# Patient Record
Sex: FEMALE | Race: BLACK OR AFRICAN AMERICAN | NOT HISPANIC OR LATINO
[De-identification: names, ages, dates, MRNs, and addresses within clinical notes are randomized per-mention and may not be internally consistent; named-entity substitution may affect disease eponyms.]

---

## 2017-12-08 PROBLEM — Z00.00 ENCOUNTER FOR PREVENTIVE HEALTH EXAMINATION: Status: ACTIVE | Noted: 2017-12-08

## 2018-01-02 ENCOUNTER — APPOINTMENT (OUTPATIENT)
Dept: OPHTHALMOLOGY | Facility: CLINIC | Age: 78
End: 2018-01-02

## 2018-07-01 ENCOUNTER — OUTPATIENT (OUTPATIENT)
Dept: OUTPATIENT SERVICES | Facility: HOSPITAL | Age: 78
LOS: 1 days | End: 2018-07-01
Payer: MEDICARE

## 2018-07-01 PROCEDURE — G9001: CPT

## 2019-04-12 DIAGNOSIS — Z71.89 OTHER SPECIFIED COUNSELING: ICD-10-CM

## 2021-05-18 ENCOUNTER — INPATIENT (INPATIENT)
Facility: HOSPITAL | Age: 81
LOS: 2 days | Discharge: HOME | DRG: 682 | End: 2021-05-21
Attending: HOSPITALIST | Admitting: HOSPITALIST
Payer: MEDICARE

## 2021-05-18 VITALS
RESPIRATION RATE: 18 BRPM | TEMPERATURE: 98 F | SYSTOLIC BLOOD PRESSURE: 120 MMHG | DIASTOLIC BLOOD PRESSURE: 94 MMHG | HEART RATE: 56 BPM | OXYGEN SATURATION: 97 %

## 2021-05-18 PROCEDURE — 73562 X-RAY EXAM OF KNEE 3: CPT | Mod: 26,50

## 2021-05-18 PROCEDURE — 71045 X-RAY EXAM CHEST 1 VIEW: CPT | Mod: 26

## 2021-05-18 PROCEDURE — 99285 EMERGENCY DEPT VISIT HI MDM: CPT | Mod: CS

## 2021-05-19 DIAGNOSIS — Z51.5 ENCOUNTER FOR PALLIATIVE CARE: ICD-10-CM

## 2021-05-19 DIAGNOSIS — R53.81 OTHER MALAISE: ICD-10-CM

## 2021-05-19 DIAGNOSIS — E43 UNSPECIFIED SEVERE PROTEIN-CALORIE MALNUTRITION: ICD-10-CM

## 2021-05-19 DIAGNOSIS — Z29.9 ENCOUNTER FOR PROPHYLACTIC MEASURES, UNSPECIFIED: ICD-10-CM

## 2021-05-19 DIAGNOSIS — I21.4 NON-ST ELEVATION (NSTEMI) MYOCARDIAL INFARCTION: ICD-10-CM

## 2021-05-19 DIAGNOSIS — N39.0 URINARY TRACT INFECTION, SITE NOT SPECIFIED: ICD-10-CM

## 2021-05-19 DIAGNOSIS — R77.8 OTHER SPECIFIED ABNORMALITIES OF PLASMA PROTEINS: ICD-10-CM

## 2021-05-19 DIAGNOSIS — E87.5 HYPERKALEMIA: ICD-10-CM

## 2021-05-19 DIAGNOSIS — R26.2 DIFFICULTY IN WALKING, NOT ELSEWHERE CLASSIFIED: ICD-10-CM

## 2021-05-19 DIAGNOSIS — M06.9 RHEUMATOID ARTHRITIS, UNSPECIFIED: ICD-10-CM

## 2021-05-19 DIAGNOSIS — N18.9 CHRONIC KIDNEY DISEASE, UNSPECIFIED: ICD-10-CM

## 2021-05-19 LAB
24R-OH-CALCIDIOL SERPL-MCNC: 19.3 NG/ML — LOW (ref 30–80)
A1C WITH ESTIMATED AVERAGE GLUCOSE RESULT: 4.6 % — SIGNIFICANT CHANGE UP (ref 4–5.6)
ACANTHOCYTES BLD QL SMEAR: SLIGHT — SIGNIFICANT CHANGE UP
ALBUMIN SERPL ELPH-MCNC: 2.8 G/DL — LOW (ref 3.5–5)
ALP SERPL-CCNC: 86 U/L — SIGNIFICANT CHANGE UP (ref 40–120)
ALT FLD-CCNC: 18 U/L DA — SIGNIFICANT CHANGE UP (ref 10–60)
ANION GAP SERPL CALC-SCNC: 13 MMOL/L — SIGNIFICANT CHANGE UP (ref 5–17)
ANION GAP SERPL CALC-SCNC: 13 MMOL/L — SIGNIFICANT CHANGE UP (ref 5–17)
ANISOCYTOSIS BLD QL: SLIGHT — SIGNIFICANT CHANGE UP
APPEARANCE UR: CLEAR — SIGNIFICANT CHANGE UP
AST SERPL-CCNC: 34 U/L — SIGNIFICANT CHANGE UP (ref 10–40)
BASOPHILS # BLD AUTO: 0.03 K/UL — SIGNIFICANT CHANGE UP (ref 0–0.2)
BASOPHILS # BLD AUTO: 0.04 K/UL — SIGNIFICANT CHANGE UP (ref 0–0.2)
BASOPHILS NFR BLD AUTO: 0.5 % — SIGNIFICANT CHANGE UP (ref 0–2)
BASOPHILS NFR BLD AUTO: 0.7 % — SIGNIFICANT CHANGE UP (ref 0–2)
BILIRUB SERPL-MCNC: 0.6 MG/DL — SIGNIFICANT CHANGE UP (ref 0.2–1.2)
BILIRUB UR-MCNC: NEGATIVE — SIGNIFICANT CHANGE UP
BUN SERPL-MCNC: 114 MG/DL — HIGH (ref 7–18)
BUN SERPL-MCNC: 117 MG/DL — HIGH (ref 7–18)
CALCIUM SERPL-MCNC: 9.1 MG/DL — SIGNIFICANT CHANGE UP (ref 8.4–10.5)
CALCIUM SERPL-MCNC: 9.4 MG/DL — SIGNIFICANT CHANGE UP (ref 8.4–10.5)
CALCIUM SERPL-MCNC: 9.5 MG/DL — SIGNIFICANT CHANGE UP (ref 8.4–10.5)
CHLORIDE SERPL-SCNC: 115 MMOL/L — HIGH (ref 96–108)
CHLORIDE SERPL-SCNC: 116 MMOL/L — HIGH (ref 96–108)
CHOLEST SERPL-MCNC: 233 MG/DL — HIGH
CO2 SERPL-SCNC: 12 MMOL/L — LOW (ref 22–31)
CO2 SERPL-SCNC: 13 MMOL/L — LOW (ref 22–31)
COLOR SPEC: YELLOW — SIGNIFICANT CHANGE UP
CREAT ?TM UR-MCNC: 71 MG/DL — SIGNIFICANT CHANGE UP
CREAT SERPL-MCNC: 8.07 MG/DL — HIGH (ref 0.5–1.3)
CREAT SERPL-MCNC: 8.35 MG/DL — HIGH (ref 0.5–1.3)
CRP SERPL-MCNC: 20 MG/L — HIGH
DIFF PNL FLD: ABNORMAL
EOSINOPHIL # BLD AUTO: 0.08 K/UL — SIGNIFICANT CHANGE UP (ref 0–0.5)
EOSINOPHIL # BLD AUTO: 0.11 K/UL — SIGNIFICANT CHANGE UP (ref 0–0.5)
EOSINOPHIL NFR BLD AUTO: 1.4 % — SIGNIFICANT CHANGE UP (ref 0–6)
EOSINOPHIL NFR BLD AUTO: 1.9 % — SIGNIFICANT CHANGE UP (ref 0–6)
ERYTHROCYTE [SEDIMENTATION RATE] IN BLOOD: 15 MM/HR — SIGNIFICANT CHANGE UP (ref 0–20)
ESTIMATED AVERAGE GLUCOSE: 85 MG/DL — SIGNIFICANT CHANGE UP (ref 68–114)
FERRITIN SERPL-MCNC: 1993 NG/ML — HIGH (ref 15–150)
FOLATE SERPL-MCNC: 5.9 NG/ML — SIGNIFICANT CHANGE UP
GLUCOSE SERPL-MCNC: 82 MG/DL — SIGNIFICANT CHANGE UP (ref 70–99)
GLUCOSE SERPL-MCNC: 89 MG/DL — SIGNIFICANT CHANGE UP (ref 70–99)
GLUCOSE UR QL: NEGATIVE — SIGNIFICANT CHANGE UP
HCT VFR BLD CALC: 29.5 % — LOW (ref 34.5–45)
HCT VFR BLD CALC: 30.8 % — LOW (ref 34.5–45)
HDLC SERPL-MCNC: 74 MG/DL — SIGNIFICANT CHANGE UP
HGB BLD-MCNC: 10.1 G/DL — LOW (ref 11.5–15.5)
HGB BLD-MCNC: 9.5 G/DL — LOW (ref 11.5–15.5)
HYPOCHROMIA BLD QL: SLIGHT — SIGNIFICANT CHANGE UP
IMM GRANULOCYTES NFR BLD AUTO: 0.2 % — SIGNIFICANT CHANGE UP (ref 0–1.5)
IMM GRANULOCYTES NFR BLD AUTO: 0.3 % — SIGNIFICANT CHANGE UP (ref 0–1.5)
IRON SATN MFR SERPL: 117 UG/DL — SIGNIFICANT CHANGE UP (ref 40–150)
IRON SATN MFR SERPL: 92 % — HIGH (ref 15–50)
KETONES UR-MCNC: NEGATIVE — SIGNIFICANT CHANGE UP
LDH SERPL L TO P-CCNC: 254 U/L — HIGH (ref 120–225)
LEUKOCYTE ESTERASE UR-ACNC: ABNORMAL
LIPID PNL WITH DIRECT LDL SERPL: 145 MG/DL — HIGH
LYMPHOCYTES # BLD AUTO: 1.02 K/UL — SIGNIFICANT CHANGE UP (ref 1–3.3)
LYMPHOCYTES # BLD AUTO: 1.32 K/UL — SIGNIFICANT CHANGE UP (ref 1–3.3)
LYMPHOCYTES # BLD AUTO: 18 % — SIGNIFICANT CHANGE UP (ref 13–44)
LYMPHOCYTES # BLD AUTO: 22.3 % — SIGNIFICANT CHANGE UP (ref 13–44)
MAGNESIUM SERPL-MCNC: 2.1 MG/DL — SIGNIFICANT CHANGE UP (ref 1.6–2.6)
MANUAL SMEAR VERIFICATION: SIGNIFICANT CHANGE UP
MCHC RBC-ENTMCNC: 26.8 PG — LOW (ref 27–34)
MCHC RBC-ENTMCNC: 27.1 PG — SIGNIFICANT CHANGE UP (ref 27–34)
MCHC RBC-ENTMCNC: 32.2 GM/DL — SIGNIFICANT CHANGE UP (ref 32–36)
MCHC RBC-ENTMCNC: 32.8 GM/DL — SIGNIFICANT CHANGE UP (ref 32–36)
MCV RBC AUTO: 82.6 FL — SIGNIFICANT CHANGE UP (ref 80–100)
MCV RBC AUTO: 83.1 FL — SIGNIFICANT CHANGE UP (ref 80–100)
MICROCYTES BLD QL: SLIGHT — SIGNIFICANT CHANGE UP
MONOCYTES # BLD AUTO: 0.56 K/UL — SIGNIFICANT CHANGE UP (ref 0–0.9)
MONOCYTES # BLD AUTO: 0.6 K/UL — SIGNIFICANT CHANGE UP (ref 0–0.9)
MONOCYTES NFR BLD AUTO: 10.6 % — SIGNIFICANT CHANGE UP (ref 2–14)
MONOCYTES NFR BLD AUTO: 9.4 % — SIGNIFICANT CHANGE UP (ref 2–14)
NEUTROPHILS # BLD AUTO: 3.89 K/UL — SIGNIFICANT CHANGE UP (ref 1.8–7.4)
NEUTROPHILS # BLD AUTO: 3.93 K/UL — SIGNIFICANT CHANGE UP (ref 1.8–7.4)
NEUTROPHILS NFR BLD AUTO: 65.6 % — SIGNIFICANT CHANGE UP (ref 43–77)
NEUTROPHILS NFR BLD AUTO: 69.1 % — SIGNIFICANT CHANGE UP (ref 43–77)
NITRITE UR-MCNC: NEGATIVE — SIGNIFICANT CHANGE UP
NON HDL CHOLESTEROL: 159 MG/DL — HIGH
NRBC # BLD: 0 /100 WBCS — SIGNIFICANT CHANGE UP (ref 0–0)
NRBC # BLD: 0 /100 WBCS — SIGNIFICANT CHANGE UP (ref 0–0)
PH UR: 6 — SIGNIFICANT CHANGE UP (ref 5–8)
PHOSPHATE SERPL-MCNC: 5.8 MG/DL — HIGH (ref 2.5–4.5)
PLAT MORPH BLD: NORMAL — SIGNIFICANT CHANGE UP
PLATELET # BLD AUTO: 109 K/UL — LOW (ref 150–400)
PLATELET # BLD AUTO: 116 K/UL — LOW (ref 150–400)
PLATELET COUNT - ESTIMATE: ABNORMAL
POIKILOCYTOSIS BLD QL AUTO: SLIGHT — SIGNIFICANT CHANGE UP
POTASSIUM SERPL-MCNC: 5.3 MMOL/L — SIGNIFICANT CHANGE UP (ref 3.5–5.3)
POTASSIUM SERPL-MCNC: 5.4 MMOL/L — HIGH (ref 3.5–5.3)
POTASSIUM SERPL-SCNC: 5.3 MMOL/L — SIGNIFICANT CHANGE UP (ref 3.5–5.3)
POTASSIUM SERPL-SCNC: 5.4 MMOL/L — HIGH (ref 3.5–5.3)
PROCALCITONIN SERPL-MCNC: 0.66 NG/ML — HIGH (ref 0.02–0.1)
PROT ?TM UR-MCNC: 259 MG/DL — HIGH (ref 0–12)
PROT SERPL-MCNC: 7.1 G/DL — SIGNIFICANT CHANGE UP (ref 6–8.3)
PROT UR-MCNC: 500 MG/DL
PTH-INTACT FLD-MCNC: 157 PG/ML — HIGH (ref 15–65)
RBC # BLD: 3.55 M/UL — LOW (ref 3.8–5.2)
RBC # BLD: 3.55 M/UL — LOW (ref 3.8–5.2)
RBC # BLD: 3.73 M/UL — LOW (ref 3.8–5.2)
RBC # FLD: 18.4 % — HIGH (ref 10.3–14.5)
RBC # FLD: 18.6 % — HIGH (ref 10.3–14.5)
RBC BLD AUTO: ABNORMAL
RETICS #: 28.7 K/UL — SIGNIFICANT CHANGE UP (ref 25–125)
RETICS/RBC NFR: 0.8 % — SIGNIFICANT CHANGE UP (ref 0.5–2.5)
SARS-COV-2 RNA SPEC QL NAA+PROBE: SIGNIFICANT CHANGE UP
SCHISTOCYTES BLD QL AUTO: SLIGHT — SIGNIFICANT CHANGE UP
SODIUM SERPL-SCNC: 141 MMOL/L — SIGNIFICANT CHANGE UP (ref 135–145)
SODIUM SERPL-SCNC: 141 MMOL/L — SIGNIFICANT CHANGE UP (ref 135–145)
SODIUM UR-SCNC: 54 MMOL/L — SIGNIFICANT CHANGE UP
SP GR SPEC: 1.01 — SIGNIFICANT CHANGE UP (ref 1.01–1.02)
T4 FREE SERPL-MCNC: 0.6 NG/DL — LOW (ref 0.9–1.8)
THYROPEROXIDASE AB SERPL-ACNC: <10 IU/ML — SIGNIFICANT CHANGE UP
TIBC SERPL-MCNC: 127 UG/DL — LOW (ref 250–450)
TRIGL SERPL-MCNC: 71 MG/DL — SIGNIFICANT CHANGE UP
TROPONIN I SERPL-MCNC: 0.06 NG/ML — HIGH (ref 0–0.04)
TROPONIN I SERPL-MCNC: 0.06 NG/ML — HIGH (ref 0–0.04)
TSH SERPL-MCNC: 66.5 UU/ML — HIGH (ref 0.34–4.82)
UIBC SERPL-MCNC: 10 UG/DL — LOW (ref 110–370)
URATE SERPL-MCNC: 8.3 MG/DL — HIGH (ref 2.5–7)
URATE UR-MCNC: 24.5 MG/DL — SIGNIFICANT CHANGE UP
UROBILINOGEN FLD QL: NEGATIVE — SIGNIFICANT CHANGE UP
VIT B12 SERPL-MCNC: 979 PG/ML — SIGNIFICANT CHANGE UP (ref 232–1245)
VIT D25+D1,25 OH+D1,25 PNL SERPL-MCNC: 41.2 PG/ML — SIGNIFICANT CHANGE UP (ref 19.9–79.3)
WBC # BLD: 5.68 K/UL — SIGNIFICANT CHANGE UP (ref 3.8–10.5)
WBC # BLD: 5.93 K/UL — SIGNIFICANT CHANGE UP (ref 3.8–10.5)
WBC # FLD AUTO: 5.68 K/UL — SIGNIFICANT CHANGE UP (ref 3.8–10.5)
WBC # FLD AUTO: 5.93 K/UL — SIGNIFICANT CHANGE UP (ref 3.8–10.5)

## 2021-05-19 PROCEDURE — 99223 1ST HOSP IP/OBS HIGH 75: CPT

## 2021-05-19 PROCEDURE — 99222 1ST HOSP IP/OBS MODERATE 55: CPT

## 2021-05-19 PROCEDURE — 99497 ADVNCD CARE PLAN 30 MIN: CPT | Mod: 25

## 2021-05-19 RX ORDER — SODIUM CHLORIDE 9 MG/ML
1000 INJECTION INTRAMUSCULAR; INTRAVENOUS; SUBCUTANEOUS ONCE
Refills: 0 | Status: COMPLETED | OUTPATIENT
Start: 2021-05-19 | End: 2021-05-19

## 2021-05-19 RX ORDER — SODIUM BICARBONATE 1 MEQ/ML
1300 SYRINGE (ML) INTRAVENOUS
Refills: 0 | Status: DISCONTINUED | OUTPATIENT
Start: 2021-05-19 | End: 2021-05-21

## 2021-05-19 RX ORDER — HYDRALAZINE HCL 50 MG
5 TABLET ORAL ONCE
Refills: 0 | Status: COMPLETED | OUTPATIENT
Start: 2021-05-19 | End: 2021-05-19

## 2021-05-19 RX ORDER — ERGOCALCIFEROL 1.25 MG/1
0 CAPSULE ORAL
Qty: 0 | Refills: 0 | DISCHARGE

## 2021-05-19 RX ORDER — LEVOTHYROXINE SODIUM 125 MCG
25 TABLET ORAL DAILY
Refills: 0 | Status: DISCONTINUED | OUTPATIENT
Start: 2021-05-19 | End: 2021-05-21

## 2021-05-19 RX ORDER — ACETAMINOPHEN 500 MG
650 TABLET ORAL EVERY 6 HOURS
Refills: 0 | Status: DISCONTINUED | OUTPATIENT
Start: 2021-05-19 | End: 2021-05-21

## 2021-05-19 RX ORDER — CEFTRIAXONE 500 MG/1
1000 INJECTION, POWDER, FOR SOLUTION INTRAMUSCULAR; INTRAVENOUS ONCE
Refills: 0 | Status: COMPLETED | OUTPATIENT
Start: 2021-05-19 | End: 2021-05-19

## 2021-05-19 RX ORDER — SODIUM ZIRCONIUM CYCLOSILICATE 10 G/10G
10 POWDER, FOR SUSPENSION ORAL ONCE
Refills: 0 | Status: COMPLETED | OUTPATIENT
Start: 2021-05-19 | End: 2021-05-19

## 2021-05-19 RX ORDER — ISOSORBIDE MONONITRATE 60 MG/1
120 TABLET, EXTENDED RELEASE ORAL DAILY
Refills: 0 | Status: DISCONTINUED | OUTPATIENT
Start: 2021-05-20 | End: 2021-05-21

## 2021-05-19 RX ORDER — CALCITRIOL 0.5 UG/1
0 CAPSULE ORAL
Qty: 0 | Refills: 0 | DISCHARGE

## 2021-05-19 RX ORDER — HEPARIN SODIUM 5000 [USP'U]/ML
5000 INJECTION INTRAVENOUS; SUBCUTANEOUS EVERY 8 HOURS
Refills: 0 | Status: DISCONTINUED | OUTPATIENT
Start: 2021-05-19 | End: 2021-05-21

## 2021-05-19 RX ORDER — ACETAMINOPHEN 500 MG
650 TABLET ORAL ONCE
Refills: 0 | Status: COMPLETED | OUTPATIENT
Start: 2021-05-19 | End: 2021-05-19

## 2021-05-19 RX ORDER — AMLODIPINE BESYLATE 2.5 MG/1
10 TABLET ORAL DAILY
Refills: 0 | Status: DISCONTINUED | OUTPATIENT
Start: 2021-05-19 | End: 2021-05-21

## 2021-05-19 RX ORDER — LABETALOL HCL 100 MG
200 TABLET ORAL DAILY
Refills: 0 | Status: DISCONTINUED | OUTPATIENT
Start: 2021-05-19 | End: 2021-05-20

## 2021-05-19 RX ORDER — CEFTRIAXONE 500 MG/1
1000 INJECTION, POWDER, FOR SOLUTION INTRAMUSCULAR; INTRAVENOUS EVERY 24 HOURS
Refills: 0 | Status: DISCONTINUED | OUTPATIENT
Start: 2021-05-20 | End: 2021-05-21

## 2021-05-19 RX ADMIN — CEFTRIAXONE 100 MILLIGRAM(S): 500 INJECTION, POWDER, FOR SOLUTION INTRAMUSCULAR; INTRAVENOUS at 02:50

## 2021-05-19 RX ADMIN — HEPARIN SODIUM 5000 UNIT(S): 5000 INJECTION INTRAVENOUS; SUBCUTANEOUS at 06:16

## 2021-05-19 RX ADMIN — Medication 650 MILLIGRAM(S): at 01:30

## 2021-05-19 RX ADMIN — HEPARIN SODIUM 5000 UNIT(S): 5000 INJECTION INTRAVENOUS; SUBCUTANEOUS at 21:48

## 2021-05-19 RX ADMIN — CEFTRIAXONE 1000 MILLIGRAM(S): 500 INJECTION, POWDER, FOR SOLUTION INTRAMUSCULAR; INTRAVENOUS at 02:50

## 2021-05-19 RX ADMIN — Medication 650 MILLIGRAM(S): at 06:16

## 2021-05-19 RX ADMIN — SODIUM CHLORIDE 1000 MILLILITER(S): 9 INJECTION INTRAMUSCULAR; INTRAVENOUS; SUBCUTANEOUS at 02:21

## 2021-05-19 RX ADMIN — Medication 650 MILLIGRAM(S): at 01:00

## 2021-05-19 RX ADMIN — Medication 5 MILLIGRAM(S): at 22:10

## 2021-05-19 RX ADMIN — Medication 200 MILLIGRAM(S): at 20:16

## 2021-05-19 RX ADMIN — Medication 1300 MILLIGRAM(S): at 18:06

## 2021-05-19 RX ADMIN — SODIUM ZIRCONIUM CYCLOSILICATE 10 GRAM(S): 10 POWDER, FOR SUSPENSION ORAL at 03:23

## 2021-05-19 RX ADMIN — AMLODIPINE BESYLATE 10 MILLIGRAM(S): 2.5 TABLET ORAL at 17:46

## 2021-05-19 NOTE — ED PROVIDER NOTE - ENMT, MLM
Normocephalic atraumatic head. Airway patent, Nasal mucosa clear. Mouth with normal mucosa. Throat has no vesicles, no oropharyngeal exudates and uvula is midline.

## 2021-05-19 NOTE — CONSULT NOTE ADULT - SUBJECTIVE AND OBJECTIVE BOX
CHIEF COMPLAINT: Fall    HPI: 80 yo F with HTN and ESRD (refusing HD) who presented after a fall. Patient     PAST MEDICAL & SURGICAL HISTORY:  As above, also Anemia, Rheumatoid arthritis    No significant past surgical history    Allergies    penicillin (Rash)    MEDICATIONS  (STANDING):  heparin   Injectable 5000 Unit(s) SubCutaneous every 8 hours    MEDICATIONS  (PRN):  acetaminophen   Tablet .. 650 milliGRAM(s) Oral every 6 hours PRN Mild Pain (1 - 3)      FAMILY HISTORY:    No family history of premature coronary artery disease or sudden cardiac death    SOCIAL HISTORY:  Smoking-  Alcohol-  Illicit Drug use-    REVIEW OF SYSTEMS:  Constitutional: [ ] fever, [ ]weight loss,  [ ]fatigue  Eyes: [ ] visual changes  Respiratory: [ ]shortness of breath;  [ ] cough, [ ]wheezing, [ ]chills, [ ]hemoptysis  Cardiovascular: [ ] chest pain, [ ]palpitations, [ ]dizziness,  [ ]leg swelling [ ]syncope  Gastrointestinal: [ ] abdominal pain, [ ]nausea, [ ]vomiting,  [ ]diarrhea   Genitourinary: [ ] dysuria, [ ] hematuria  Neurologic: [ ] headaches [ ] tremors  [ ] weakness [ ] lightheadedness  Skin: [ ] itching, [ ]burning, [ ] rashes  Endocrine: [ ] heat or cold intolerance  Musculoskeletal: [ ] joint pain or swelling; [ ] muscle, back, or extremity pain  Psychiatric: [ ] depression, [ ]anxiety, [ ]mood swings, or [ ]difficulty sleeping  Hematologic: [ ] easy bruising, [ ] bleeding gums       [ x] All others negative	  [ ] Unable to obtain    Vital Signs Last 24 Hrs  T(C): 36.2 (19 May 2021 07:54), Max: 36.8 (19 May 2021 03:06)  T(F): 97.2 (19 May 2021 07:54), Max: 98.3 (19 May 2021 03:06)  HR: 58 (19 May 2021 07:54) (56 - 67)  BP: 175/69 (19 May 2021 07:54) (120/94 - 175/69)  BP(mean): --  RR: 18 (19 May 2021 07:54) (18 - 18)  SpO2: 100% (19 May 2021 07:54) (97% - 100%)  I&O's Summary      PHYSICAL EXAM:  General: No acute distress  HEENT: EOMI, PERRL  Neck: Supple, No JVD  Lungs: Clear to auscultation bilaterally; No rales or wheezing  Heart: Regular rate and rhythm; No murmurs, rubs, or gallops  Abdomen: Nontender, bowel sounds present  Extremities: No clubbing, cyanosis, or edema  Nervous system:  Alert & Oriented X3, no focal deficits  Psychiatric: Normal affect  Skin: No rashes or lesions      LABS:  05-19    141  |  116<H>  |  114<H>  ----------------------------<  82  5.3   |  12<L>  |  8.07<H>    Ca    9.1      19 May 2021 05:51  Phos  5.8     05-19  Mg     2.1     05-19    TPro  7.1  /  Alb  2.8<L>  /  TBili  0.6  /  DBili  x   /  AST  34  /  ALT  18  /  AlkPhos  86  05-19    Creatinine Trend: 8.07<--, 8.35<--                        9.5    5.93  )-----------( 109      ( 19 May 2021 05:51 )             29.5     Lipid Panel: Cholesterol, Serum 233  Direct LDL --145  HDL Cholesterol, Serum 74  Triglycerides, Serum 71    Cardiac Enzymes: CARDIAC MARKERS ( 19 May 2021 05:51 )  0.061 ng/mL / x     / x     / x     / x      CARDIAC MARKERS ( 19 May 2021 00:33 )  0.062 ng/mL / x     / x     / x     / x        RADIOLOGY: < from: Xray Chest 1 View-PORTABLE IMMEDIATE (Xray Chest 1 View-PORTABLE IMMEDIATE .) (05.18.21 @ 23:49) >  IMPRESSION: Respiratory motion. No gross infiltrate.    < end of copied text >    ECG [my interpretation]: 5/19/21 @ 01:55: Sinus rhythm, LAFB    TELEMETRY:      CHIEF COMPLAINT: Fall    HPI: 82 yo F with HTN and ESRD (refusing HD) who presented after a fall. Patient reports that her knees gave out under her and she fell down. Denies any preceding or subsequent chest pain, dyspnea, palpitations, lightheadedness, or syncope. Patient denies LE edema, orthopnea, or PND. Currently has no complaints aside from her legs.     PAST MEDICAL & SURGICAL HISTORY:  As above, also Anemia, Rheumatoid arthritis    No significant past surgical history    Allergies    penicillin (Rash)    MEDICATIONS  (STANDING):  heparin   Injectable 5000 Unit(s) SubCutaneous every 8 hours    MEDICATIONS  (PRN):  acetaminophen   Tablet .. 650 milliGRAM(s) Oral every 6 hours PRN Mild Pain (1 - 3)    FAMILY HISTORY:    No family history of premature coronary artery disease or sudden cardiac death    SOCIAL HISTORY:  Smoking-Denies  Alcohol-Denies  Illicit Drug use-Denies    REVIEW OF SYSTEMS:  Constitutional: [ ] fever, [ ]weight loss,  [ ]fatigue  Eyes: [ ] visual changes  Respiratory: [ ]shortness of breath;  [ ] cough, [ ]wheezing, [ ]chills, [ ]hemoptysis  Cardiovascular: [ ] chest pain, [ ]palpitations, [ ]dizziness,  [ ]leg swelling [ ]syncope  Gastrointestinal: [ ] abdominal pain, [ ]nausea, [ ]vomiting,  [ ]diarrhea   Genitourinary: [ ] dysuria, [ ] hematuria  Neurologic: [ ] headaches [ ] tremors  [ ] weakness [ ] lightheadedness  Skin: [ ] itching, [ ]burning, [ ] rashes  Endocrine: [ ] heat or cold intolerance  Musculoskeletal: [ ] joint pain or swelling; [ ] muscle, back, or extremity pain  Psychiatric: [ ] depression, [ ]anxiety, [ ]mood swings, or [ ]difficulty sleeping  Hematologic: [ ] easy bruising, [ ] bleeding gums       [ x] All others negative	  [ ] Unable to obtain    Vital Signs Last 24 Hrs  T(C): 36.2 (19 May 2021 07:54), Max: 36.8 (19 May 2021 03:06)  T(F): 97.2 (19 May 2021 07:54), Max: 98.3 (19 May 2021 03:06)  HR: 58 (19 May 2021 07:54) (56 - 67)  BP: 175/69 (19 May 2021 07:54) (120/94 - 175/69)  BP(mean): --  RR: 18 (19 May 2021 07:54) (18 - 18)  SpO2: 100% (19 May 2021 07:54) (97% - 100%)  I&O's Summary      PHYSICAL EXAM:  General: No acute distress  HEENT: EOMI, PERRL  Neck: Supple, No JVD  Lungs: Clear to auscultation bilaterally; No rales or wheezing  Heart: Regular rate and rhythm; No murmurs, rubs, or gallops  Abdomen: Nontender, bowel sounds present  Extremities: No clubbing, cyanosis, or edema  Nervous system:  Alert & Oriented X3, no focal deficits  Psychiatric: Normal affect  Skin: No rashes or lesions      LABS:  05-19    141  |  116<H>  |  114<H>  ----------------------------<  82  5.3   |  12<L>  |  8.07<H>    Ca    9.1      19 May 2021 05:51  Phos  5.8     05-19  Mg     2.1     05-19    TPro  7.1  /  Alb  2.8<L>  /  TBili  0.6  /  DBili  x   /  AST  34  /  ALT  18  /  AlkPhos  86  05-19    Creatinine Trend: 8.07<--, 8.35<--                        9.5    5.93  )-----------( 109      ( 19 May 2021 05:51 )             29.5     Lipid Panel: Cholesterol, Serum 233  Direct LDL --145  HDL Cholesterol, Serum 74  Triglycerides, Serum 71    Cardiac Enzymes: CARDIAC MARKERS ( 19 May 2021 05:51 )  0.061 ng/mL / x     / x     / x     / x      CARDIAC MARKERS ( 19 May 2021 00:33 )  0.062 ng/mL / x     / x     / x     / x        RADIOLOGY: < from: Xray Chest 1 View-PORTABLE IMMEDIATE (Xray Chest 1 View-PORTABLE IMMEDIATE .) (05.18.21 @ 23:49) >  IMPRESSION: Respiratory motion. No gross infiltrate.    < end of copied text >    ECG [my interpretation]: 5/19/21 @ 01:55: Sinus rhythm, LAFB    TELEMETRY: Sinus rhythm, no events

## 2021-05-19 NOTE — CONSULT NOTE ADULT - SUBJECTIVE AND OBJECTIVE BOX
HPI:  82 y/o F pt from home lives with son, has HHA with a PMHx of HTN, Rheumatoid Arthritis and Anemia, kidney disease presents to ED c/o b/l knee pain and fall  evening. Pt reports due to her RA she has severe knee pain and sometimes feels her knees are cracking and legs give away. Pt uses walker to ambulate at baseline. Pt reportedly had trouble walking due to severe knee pain today so her son sent her for an evaluation. Pt does have a home attendant from 1-5PM on weekdays and her son takes care of her. Pt son was working today when the pt fell while at home alone and required her neighbor to help her. Pt denies any other acute complaints. Allergies: Penicillin  Pt was referred from rheumatologist to [facility] for kidney problem. Pt discussed dialysis option, but she doesn't want HD as "she doesn't believe in that". Nephrologist prescribed BP meds.  Pt states she stop taking prednisone 1 year ago as she feels steroid makes her condition worse in long term and stopped taking methotrexate 5 years ago. She takes only tylenol occasionally for her pain.    Interval hx: Pt seen and examined at the bedside, AOX3.  C/o pain to b/l knees, unable to quantify.        PAST MEDICAL & SURGICAL HISTORY:  Hypertension    Anemia    Arthritis    Rheumatoid arthritis    No significant past surgical history        SOCIAL HISTORY:    Admitted from:  home with her son; Has HHA 3 hours  Mon, Wed, Thurs, and Fri   Pt has 2 son, they make decisions as a family  Church:   [employer]    Surrogate/HCP/Guardian: London Mcgowan           Phone#: [phone]    FAMILY HISTORY:  No contributory history  Baseline ADLs (prior to admission): required assistance    Allergies    penicillin (Rash)    Intolerances      Present Symptoms:   Dyspnea: denies  Nausea/Vomiting: denies  Loss of appetite: yes  Pain:   yes                             location:  b/l knees        Review of Systems: [All others negative     MEDICATIONS  (STANDING):  heparin   Injectable 5000 Unit(s) SubCutaneous every 8 hours    MEDICATIONS  (PRN):  acetaminophen   Tablet .. 650 milliGRAM(s) Oral every 6 hours PRN Mild Pain (1 - 3)      PHYSICAL EXAM:    Vital Signs Last 24 Hrs  T(C): 36.4 (19 May 2021 11:16), Max: 36.8 (19 May 2021 03:06)  T(F): 97.6 (19 May 2021 11:16), Max: 98.3 (19 May 2021 03:06)  HR: 65 (19 May 2021 11:16) (56 - 67)  BP: 183/75 (19 May 2021 11:16) (120/94 - 183/75)  BP(mean): --  RR: 18 (19 May 2021 11:16) (18 - 18)  SpO2: 100% (19 May 2021 11:16) (97% - 100%)    General: Fragile elderly woman, AOX3  Karnofsky Performance Score/Palliative Performance Status Version2: 40    %    HEENT: bitemporal wasting, moist mucous membrane  Lungs: unlabored on RA  CV: RRR  GI: soft, non tender on palpation  : urinating  Musculoskeletal: b/l  UE and LE deformities   Skin: no rash or lesions noted  Neuro: no deficits cognitive impairment   Oral intake ability: poor po intake      LABS:                        9.5    5.93  )-----------( 109      ( 19 May 2021 05:51 )             29.5         141  |  116<H>  |  114<H>  ----------------------------<  82  5.3   |  12<L>  |  8.07<H>    Ca    9.1      19 May 2021 05:51  Phos  5.8       Mg     2.1         TPro  7.1  /  Alb  2.8<L>  /  TBili  0.6  /  DBili  x   /  AST  34  /  ALT  18  /  AlkPhos  86      Urinalysis Basic - ( 19 May 2021 02:26 )    Color: Yellow / Appearance: Clear / S.015 / pH: x  Gluc: x / Ketone: Negative  / Bili: Negative / Urobili: Negative   Blood: x / Protein: 500 mg/dL / Nitrite: Negative   Leuk Esterase: Moderate / RBC: 5-10 /HPF / WBC 11-25 /HPF   Sq Epi: x / Non Sq Epi: Few /HPF / Bacteria: Moderate /HPF    < from: Xray Chest 1 View-PORTABLE IMMEDIATE (Xray Chest 1 View-PORTABLE IMMEDIATE .) (21 @ 23:49) >  EXAM:  XR CHEST PORTABLE IMMED 1V                            PROCEDURE DATE:  2021          INTERPRETATION:  AP chest on May 18, 2021 at 11:36 PM. Patient had a fall and has pain in both knees.    COMPARISON: None available.    There is respiratory motion on the study.    Heart magnified by technique.    No gross infiltrate.    There are Hill-Sachs deformities in both humeral heads.    IMPRESSION: Respiratory motion. No gross infiltrate.        < end of copied text >      RADIOLOGY & ADDITIONAL STUDIES: Reviewed    ADVANCE DIRECTIVES: DNR/DNI

## 2021-05-19 NOTE — ED PROVIDER NOTE - CLINICAL SUMMARY MEDICAL DECISION MAKING FREE TEXT BOX
82 y/o F pt Rheumatoid Arthritis and HTN presents w/worsening b/l knee pain and recurrent falls. Will obtain EKG, labs, knee X-rays 80 y/o F pt Rheumatoid Arthritis and HTN presents w/worsening b/l knee pain and recurrent falls. Will obtain EKG, labs, knee X-rays    ekg nonischemic, trop 0.62-ASA held in setting of CKD and no active chest pain, Cr 8.3, K 5.4-given 1L NS and child catheter placed, only minimal urine output, UA sent  CXR shows bilateral patchy infiltrates, knee Xrs shows degenerative changes  Discussed above with patient and son over phone who agree with plan for admission. 80 y/o F pt Rheumatoid Arthritis and HTN presents w/worsening b/l knee pain and recurrent falls. Will obtain EKG, labs, knee X-rays    ekg nonischemic, trop 0.62-ASA held in setting of CKD and no active chest pain, Cr 8.3, K 5.4-given 1L NS and child catheter placed, only minimal urine output, UA cw uti  CXR shows bilateral patchy infiltrates, knee Xrs shows degenerative changes  Discussed above with patient and son over phone who agree with plan for admission.

## 2021-05-19 NOTE — CONSULT NOTE ADULT - ATTENDING COMMENTS
81 y F with severe RA, progression of CKD, min ambulatory, s/p fall. Lives w son, has HHA 4 hr/d. Pt refusing HD, pt and son to discuss further goals of care. Palliative will follow. 81 y F with severe RA, progression of CKD, min ambulatory, s/p fall. Lives w son, has HHA 4 hr/d. Pt refusing HD, pt and son to discuss further goals of care. Palliative will follow. DNR/DNI on file.

## 2021-05-19 NOTE — H&P ADULT - PROBLEM SELECTOR PLAN 5
Pt stop taking prednisone 1 year ago as she feels steroid makes her condition worse in long term and stopped taking methotrexate 5 years ago  Not on any RA meds now  Pt is refusing prednisone  Pain control with tylenol, tramadol, percocet  Pain management is consulted

## 2021-05-19 NOTE — H&P ADULT - PROBLEM SELECTOR PLAN 1
- P/w Cr 8.35  - baseline unknown, but she was evaluated in [facility] for kidney dysfunction 1 mon ago  - Pt is refusing HD   - s/p danyell, pt is making urine  - f/u PTH  - f/u BMP   f/u urine study  f/u  FeNa   Nephro Dr. Tim group is consulted - P/w Cr 8.35  - baseline unknown, but she was evaluated in [facility] for kidney dysfunction 1 mon ago  - Pt is refusing HD   - s/p danyell, pt is making urine  - f/u PTH  - f/u BMP   f/u urine study  f/u  FeNa   Nephro Dr. Tim group is consulted  Palliative is consulted as pt is refusing HD

## 2021-05-19 NOTE — CONSULT NOTE ADULT - ASSESSMENT
Patient is a 80yo Female with CKD-5, HTN 2/2 CKD, RA, Anemia p/w b/l knee pain s/p fall. Found to have advanced renal failure but refusing HD. Nephrology consulted for Elevated serum creatinine.      1. CKD-5- as per pt; last SCr ~5 and pt follows with Nephrologist Dr. Chevalier James. Pt with worsening renal function; now ESRD. Pt was told in the past she needs HD in the future but has declined.   Kera Sawant NP and I,  spoke with patient, her son London and her ex  (via phone). Discussed patients medical condition including worsening renal function and the need for HD. Discussed risk/ benefits/ alternative of RRT and different modalities of RRT. Pt initially refused RRT and then now is undecided. Pt wants more time to take to her family and will decide by tomorrow. Discussed the option of comfort care/ hospice if pt decides to decline HD. Strict I/Os. c/w Renal diet. Avoid nephrotoxins/ NSAIDs/ RCA. Monitor BMP.    2. Anemia of renal dz- low hgb. Will avoid venofer in the setting of elevated tsat/ ferritin. Will consider PIERRE if consistent with GOC. Check weight. Monitor hgb    3. HTN 2/2 CKD- uncontrolled BP- recc to increase Labetalol to 200mg PO bid, titrate as needed. c/w Amlodipine. c/w low salt diet. Monitor BP    4. Secondary HPT- PTHi 157 c/w calcitriol. Elevated serum phos- c/w low phos diet. Will consider Renvela if remains elevated. Monitor serum phos and serum calcium.     5. Metabolic acidosis- in the setting of renal failure. Recc Sodium bicarb 1300mg PO bid. Monitor serum  CO2.     6. Hyperkalemia- resolved. c/w low K diet. Can give Lokelma prn. Monitor serum K.       [facility] NEPHROLOGY  Dread Tim M.D.  Carrillo Nguyen D.O.  Madalyn Olivares M.D.  Yodit Carlos, MSN, ANP-C  [phone]    [id number] NEPHROLOGY  Dread Tim M.D.  Carrillo Nguyen D.O.  Madalyn Olivares M.D.  Yodit Carlos, MSN, ANP-C  [phone]    [address] [place] [id number]

## 2021-05-19 NOTE — CONSULT NOTE ADULT - SUBJECTIVE AND OBJECTIVE BOX
[facility] NEPHROLOGY- CONSULTATION NOTE    Patient is a 82yo Female with CKD-5, HTN 2/2 CKD, RA, Anemia p/w b/l knee pain s/p fall. Found to have advanced renal failure but refusing HD. Nephrology consulted for Elevated serum creatinine.    Pt aware of CKD for 2 years that progressively worsening. Pt follows with Nephrologist Dr. Chevalier James and was told in the past that she would need dialysis in the future. Pt states her last SCr was 5 and she was refusing HD in the past due to RA condition. Pt denies any SOB, chest pain, n/v/d, abd pain or LE edema or urinary complaints.   Pt c/o diffuse joint pain. Pt with joint deformities of b/l hands and is making it difficult to feed herself.     PAST MEDICAL & SURGICAL HISTORY:  Hypertension    Anemia    Arthritis    Rheumatoid arthritis    No significant past surgical history      penicillin (Rash)    Home Medications Reviewed  Hospital Medications:   MEDICATIONS  (STANDING):  amLODIPine   Tablet 10 milliGRAM(s) Oral daily  heparin   Injectable 5000 Unit(s) SubCutaneous every 8 hours  labetalol 200 milliGRAM(s) Oral daily    SOCIAL HISTORY:  Denies any toxic habits  FAMILY HISTORY:      REVIEW OF SYSTEMS:  Gen: no changes in weight  HEENT: no rhinorrhea  Neck: no sore throat  Cards: no chest pain  Resp: no dyspnea  GI: no nausea or vomiting or diarrhea  : no dysuria or hematuria  Vascular: no LE edema +b/l hand/ kneed pain  Derm: no rashes  Neuro: no numbness/tingling  All other review of systems is negative unless indicated above.    VITALS:  T(F): 97.7 (21 @ 15:24), Max: 98.3 (21 @ 03:06)  HR: 59 (21 @ 15:24)  BP: 208/70 (21 @ 15:24)  RR: 18 (21 @ 15:24)  SpO2: 100% (21 @ 15:24)  Wt(kg): --     @ 07:01  -   @ 16:22  --------------------------------------------------------  IN: 0 mL / OUT: 300 mL / NET: -300 mL      PHYSICAL EXAM:  Gen: NAD, calm  HEENT: MMM  Neck: no JVD  Cards: RRR, +S1/S2, no M/G/R  Resp: CTA B/L  GI: soft, NT/ND, NABS  : +child  Extremities: no LE edema B/L  b/l hand deformities from RA  Derm: no rashes  Neuro: AOX 3 but confused at times    LABS:      141  |  116<H>  |  114<H>  ----------------------------<  82  5.3   |  12<L>  |  8.07<H>    Ca    9.1      19 May 2021 05:51  Phos  5.8       Mg     2.1         TPro  7.1  /  Alb  2.8<L>  /  TBili  0.6  /  DBili      /  AST  34  /  ALT  18  /  AlkPhos  86      Creatinine Trend: 8.07 <--, 8.35 <--                        9.5    5.93  )-----------( 109      ( 19 May 2021 05:51 )             29.5     Urine Studies:  Urinalysis Basic - ( 19 May 2021 02:26 )    Color: Yellow / Appearance: Clear / S.015 / pH:   Gluc:  / Ketone: Negative  / Bili: Negative / Urobili: Negative   Blood:  / Protein: 500 mg/dL / Nitrite: Negative   Leuk Esterase: Moderate / RBC: 5-10 /HPF / WBC 11-25 /HPF   Sq Epi:  / Non Sq Epi: Few /HPF / Bacteria: Moderate /HPF      Sodium, Random Urine: 54 mmol/L ( @ 05:50)  Creatinine, Random Urine: 71 mg/dL ( @ 05:50)    RADIOLOGY & ADDITIONAL STUDIES:    < from: Xray Knee 3 Views, Bilateral (21 @ 23:50) >    EXAM:  XR KNEE AP LAT OBL 3 VIEWS BI                            PROCEDURE DATE:  2021        < end of copied text >  < from: Xray Knee 3 Views, Bilateral (21 @ 23:50) >  IMPRESSION: Arterial calcification and advanced bilateral knee degeneration. Small right knee effusion. No fracture.    < end of copied text >  < from: Xray Chest 1 View-PORTABLE IMMEDIATE (Xray Chest 1 View-PORTABLE IMMEDIATE .) (21 @ 23:49) >    EXAM:  XR CHEST PORTABLE IMMED 1V                            PROCEDURE DATE:  2021          INTERPRETATION:  AP chest on May 18, 2021 at 11:36 PM. Patient had a fall and has pain in both knees.    COMPARISON: None available.    There is respiratory motion on the study.    Heart magnified by technique.    No gross infiltrate.    There are Hill-Sachs deformities in both humeral heads.    IMPRESSION: Respiratory motion. No gross infiltrate.      < end of copied text >

## 2021-05-19 NOTE — PATIENT PROFILE ADULT - TRANSPORTATION
no Eucrisa Pregnancy And Lactation Text: This medication has not been assigned a Pregnancy Risk Category but animal studies failed to show danger with the topical medication. It is unknown if the medication is excreted in breast milk.

## 2021-05-19 NOTE — ED PROVIDER NOTE - EXTREMITY EXAM
Deformity over MCP joint to b/l hands. Pain w/range of motion of L knee. R knee stiffness unable to flex R knee.

## 2021-05-19 NOTE — CONSULT NOTE ADULT - CONVERSATION DETAILS
Met with the pt at the bedside, discussed her clinical condition and decisions regarding the treatment of advanced kidney disease.  Discussed risks vs benefits of individuals of advanced age receiving hemodialysis are at an increased risk of complications, given her frailties of unintended weight loss, worsening physical function and falls HD may not prove to optimize her life.  Ms. Mcgowan stated she is aware of the significant time commitment associated with hemodialysis and finds it to be overly burdensome.  She stated she does not want HD.  Pt is aware without HD she will die.  Although Ms. Mcgowan states she does not want HD, she appears to be having a difficult time committing to further goals of care, including hospice.    Spoke with the pt's son London, discussed above.  He stated he will need to speak with his mother before making any decisions.   Palliative care will follow.    Support provided. Met with the pt at the bedside, discussed her clinical condition and decisions regarding the treatment of advanced kidney disease.  Discussed risks vs benefits of individuals of advanced age receiving hemodialysis are at an increased risk of complications, given her frailties of unintended weight loss, worsening physical function and falls HD may not prove to optimize her life.  Ms. Mcgowan stated she is aware of the significant time commitment associated with hemodialysis and finds it to be overly burdensome.  She stated she does not want HD.  Pt is aware without HD she will die.  Although Ms. Mcgowan states she does not want HD, she appears to be having a difficult time committing to further goals of care, including hospice.    Spoke with the pt's son London, discussed above.  He stated he will need to speak with his mother before making any decisions.      Later met with the pt's son at the bedside, Dr. OBRIEN nephrologist also present.  Reviewed above.   Family needs time to consider clinical options.    Palliative care will follow.    Support provided. Met with the pt at the bedside, discussed her clinical condition and decisions regarding the treatment of advanced kidney disease.  Discussed risks vs benefits of individuals of advanced age receiving hemodialysis are at an increased risk of complications, given her frailties of unintended weight loss, worsening physical function and falls HD may not prove to optimize her life.  Ms. Mcgowan stated she is aware of the significant time commitment associated with hemodialysis and finds it to be overly burdensome.  She stated she does not want HD.  Pt is aware without HD she will die.  Although Ms. Mcgowan states she does not want HD, she appears to be having a difficult time committing to further goals of care, including hospice.    Spoke with the pt's son London, discussed above.  He stated he will need to speak with his mother before making any decisions.      Later met with the pt's son at the bedside, Dr. Olivares. nephrologist also present.  Reviewed above.   Family needs time to consider clinical options.    Palliative care will follow.    Support provided.

## 2021-05-19 NOTE — H&P ADULT - PROBLEM SELECTOR PLAN 3
- positive UA   - afebrile   - Pt has dysuria , increased urinary frequency  - Will start with rocephine 1g daily  - f/u urine cultures (specimen received)

## 2021-05-19 NOTE — CONSULT NOTE ADULT - PROBLEM SELECTOR RECOMMENDATION 3
2/2 comorbidities.  RA and CKD.  Bitemporal wasting with muscle mass/fat loss.  Pt reports poor po intake, with unintended weight loss. Albumin 2.8.  Diet as tolerated. High risk for skin failure.   Nutrition consult

## 2021-05-19 NOTE — CONSULT NOTE ADULT - PROBLEM SELECTOR RECOMMENDATION 9
CARLITO on CKD.  Elevated Scr, GFR 5.  Unknown baseline.  Nephrology consult    Pt stated she does not want HD.  Unable to commit to further goals of care including hospice.    Discussed hospice philosophy and locations of care.  Pt son needs time to speak with her about HD.  Palliative care will follow.         Avoid NSAIDs CARLITO on CKD.  Elevated Scr, GFR 5. Per PMD BUN 47 and Scr 1.99 in 2019.   Nephrology consult    Pt stated she does not want HD.  Unable to commit to further goals of care including hospice.    Discussed hospice philosophy and locations of care.  Pt son needs time to speak with her about HD.  Palliative care will follow.         Avoid NSAIDs

## 2021-05-19 NOTE — ED PROVIDER NOTE - OBJECTIVE STATEMENT
82 y/o F pt with a PMHx of HTN, Rheumatoid Arthritis and Anemia presents to ED c/o b/l knee pain and fall this evening. Pt reports due to her RA she has severe knee pain and sometimes feels her knees are cracking and legs give away. Pt uses walker to ambulate at baseline. Pt reportedly had trouble walking due to severe knee pain today so her son sent her for an evaluation. Pt does have a home attendant from 1-5PM on weekdays and her son takes care of her. Pt son was working today when the pt fell while at home alone and required her neighbor to help her. Pt denies any other acute complaints. Allergies: Penicillin 80 y/o F pt with a PMHx of HTN, Rheumatoid Arthritis and Anemia presents to ED c/o b/l knee pain and fall this evening. Pt reports due to her RA she has severe knee pain and sometimes feels her knees are cracking and legs give away. Pt uses walker to ambulate at baseline. Pt reportedly had trouble walking due to severe knee pain today so her son sent her for an evaluation. Pt does have a home attendant from 1-5PM on weekdays and her son takes care of her. Pt son was working today when the pt fell while at home alone and required her neighbor to help her. Pt denies any other acute complaints. Allergies: Penicillin    Spoke to Son maria d over phone [phone] who reports patient was hospitalized 1 month ago at [facility] where she was told she had kidney problems but did not require dialysis at that time. He reports current meds include: amlodipine 10mg daily, labetalol 200mg daily, isosorbide 120mg daily, calcitrol and Vitamin D

## 2021-05-19 NOTE — H&P ADULT - ASSESSMENT
82 y/o F pt from home lives with son, has HHA with a PMHx of HTN, Rheumatoid Arthritis and Anemia, kidney disease presents to ED c/o b/l knee pain and fall 5/18 evening. Pt reports due to her RA she has severe knee pain and sometimes feels her knees are cracking and legs give away. Pt uses walker to ambulate at baseline. Pt reportedly had trouble walking due to severe knee pain today so her son sent her for an evaluation. Pt does have a home attendant from 1-5PM on weekdays and her son takes care of her. Pt son was working today when the pt fell while at home alone and required her neighbor to help her. Pt denies any other acute complaints. Allergies: Penicillin  Pt was referred from rheumatologist to [facility] for kidney problem. Pt discussed dialysis option, but she doesn't want HD as "she doesn't believe in that". Nephrologist prescribed BP meds.  Pt states she stop taking prednisone 1 year ago as she feels steroid makes her condition worse in long term and stopped taking methotrexate 5 years ago. She takes only tylenol occasionally for her pain.    ED attending spoke to Lambert Callahan over phone [phone] reports current meds include: amlodipine 10mg daily, labetalol 200mg daily, isosorbide 120mg daily, calcitrol and Vitamin D.   82 y/o F pt from home lives with son, has HHA with a PMHx of HTN, Rheumatoid Arthritis and Anemia, kidney disease presents to ED c/o b/l knee pain and fall 5/18 evening. Pt is admitted for CKD/CARLITO and ambulatory dysfunction.      Pt reports due to her RA she has severe knee pain and sometimes feels her knees are cracking and legs give away. Pt uses walker to ambulate at baseline. Pt reportedly had trouble walking due to severe knee pain today so her son sent her for an evaluation. Pt does have a home attendant from 1-5PM on weekdays and her son takes care of her. Pt son was working today when the pt fell while at home alone and required her neighbor to help her. Pt denies any other acute complaints. Allergies: Penicillin  Pt was referred from rheumatologist to [facility] for kidney problem. Pt discussed dialysis option, but she doesn't want HD as "she doesn't believe in that". Nephrologist prescribed BP meds.  Pt states she stop taking prednisone 1 year ago as she feels steroid makes her condition worse in long term and stopped taking methotrexate 5 years ago. She takes only tylenol occasionally for her pain.    ED attending spoke to Lambert Callahan over phone [phone] reports current meds include: amlodipine 10mg daily, labetalol 200mg daily, isosorbide 120mg daily, calcitrol and Vitamin D.   80 y/o F pt from home lives with son, has HHA with a PMHx of HTN, Rheumatoid Arthritis and Anemia, kidney disease presents to ED c/o b/l knee pain and fall 5/18 evening. Pt is admitted for CKD/CARLITO and ambulatory dysfunction.    Lambert Callahan over phone [phone]     Son and pt gave us some of meds list, however please confirm home meds with [facility] ([phone])   82 y/o F pt from home lives with son, has HHA with a PMHx of HTN, Rheumatoid Arthritis and Anemia, kidney disease presents to ED c/o b/l knee pain and fall 5/18 evening. Pt is admitted for CKD/CARLITO and ambulatory dysfunction.    Lambert Callahan over phone [phone]     Son and pt gave us some of meds list, however please confirm home meds with pharmacy [facility] ([phone])    Rheumatologist : Dr. Rajeev Abarca [phone] ([phone]  nephrologist : Dr. Chevalier James [phone]

## 2021-05-19 NOTE — H&P ADULT - ATTENDING COMMENTS
Patient is an 81 year old female with a PMH of HTN, Rheumatoid Arthritis, Chronic Renal Insufficiency, Chronic Anemia who was BIBEMS due to knee pain.  Patient states that for the past several months prior to current presentation, she has been experiencing progressively worsening and debilitating pain in her bilateral knees, for which she was even recently admitted to ?[facility] in [place] as well as seen at the [facility] in [place].    Patient states that approximately 1 month prior to current presentation, she was "given a shot in my knees" which reportedly "helped for a few weeks".     At time of examination in the ED, patient endorses severe bilateral knee pain.  However, patient denies any headache, dizziness, chest pain, palpitations, shortness of breath, abdominal pain, nausea/vomiting/diarrhea.    T(C): 36.8 (05-19-21 @ 03:06), Max: 36.8 (05-19-21 @ 03:06)  T(F): 98.3 (05-19-21 @ 03:06), Max: 98.3 (05-19-21 @ 03:06)  HR: 67 (05-19-21 @ 03:06) (56 - 67)  BP: 166/69 (05-19-21 @ 03:06) (120/94 - 166/69)  RR: 18 (05-19-21 @ 03:06) (18 - 18)  SpO2: 97% (05-19-21 @ 03:06) (97% - 97%)  Wt(kg): --    P/E: As above MAR    A/P:    Bilateral Knee Pain likely due to Severe Rheumatoid Arthritis:  -Will send RF and Anti-CCP as well as ESR, CRP  -Will use Tylenol PRN for analgesia  -As it is uncertain whether currently presenting disease process is an active/acute inflammatory event vs insidiously worsened/progressive degeneration of her underlying disease process, will assess patient's clinical response to systemic glucocorticoids.  Therefore, will start patient on a (low-dose) Prednisone 10mg daily, for now  -Rehab consult  -In light of severity of patient's disease process, would be appropriate to ask Rheum for medication guidance  -Should also ask  to speak with patient to see if any additional HHA hours may be of benefit patient    Hyperkalemia:  -Will send repeat K STAT  -If level remains elevated, will administer Lokelma  -Will need to continue to closely monitor CMP    Chronic Renal Insufficiency:  -Should attempt to obtain medical records to evaluate trend of renal function  -eGFR= 5 (No baseline available)  -Cr= 8.35 (No baseline available)  -Will send Parathyroid Level and Lipid Panel  -Should obtain Bilateral Renal Sonogram  -May need to involve Nephro as patient likely requires HD or at least most likely will in the very near future    Troponinemia likely due to Type 2 MI:  -HEART Score= 3 (Age >=65, 1-2 Risk Factors), Low Score, Risk of MACE of 0.9-1.7%.  -Troponin= 0.062 (No baseline available)  -Will continue to trend troponin with simultaneous acquisition of EKG    Normocytic Anemia:  -Will send Retic Count    HTN:  -Will resume patient's home medications  -Vital Signs Q4H    Hypoalbuminemia:  -Nutrition Consult    GI/DVT PPx:  -Heparin  -Pepcid Patient is an 81 year old female with a PMH of HTN, Rheumatoid Arthritis, Chronic Renal Insufficiency, Chronic Anemia who was BIBEMS due to knee pain.  Patient states that for the past several months prior to current presentation, she has been experiencing progressively worsening and debilitating pain in her bilateral knees, for which she was even recently admitted to ?[facility] in [place] as well as seen at the [facility] in [place].    Patient states that approximately 1 month prior to current presentation, she was "given a shot in my knees" which reportedly "helped for a few weeks".     At time of examination in the ED, patient endorses severe bilateral knee pain.  However, patient denies any headache, dizziness, chest pain, palpitations, shortness of breath, abdominal pain, nausea/vomiting/diarrhea.    T(C): 36.8 (05-19-21 @ 03:06), Max: 36.8 (05-19-21 @ 03:06)  T(F): 98.3 (05-19-21 @ 03:06), Max: 98.3 (05-19-21 @ 03:06)  HR: 67 (05-19-21 @ 03:06) (56 - 67)  BP: 166/69 (05-19-21 @ 03:06) (120/94 - 166/69)  RR: 18 (05-19-21 @ 03:06) (18 - 18)  SpO2: 97% (05-19-21 @ 03:06) (97% - 97%)  Wt(kg): --    P/E: As above MAR    A/P:    Bilateral Knee Pain likely due to Severe Rheumatoid Arthritis:  -Will send RF and Anti-CCP as well as ESR, CRP  -Will use Tylenol PRN for analgesia  -As it is uncertain whether currently presenting disease process is an active/acute inflammatory event vs insidiously worsened/progressive degeneration of her underlying disease process, will assess patient's clinical response to systemic glucocorticoids.  Therefore, will start patient on a (low-dose) Prednisone 10mg daily, for now  -Rehab consult  -In light of severity of patient's disease process and patient's history of refusal to take certain medications as she questions their efficacy, would be beneficial to ask Rheum for further medication guidance  -Should also ask  to speak with patient to see if any additional HHA hours may be of benefit patient    Hyperkalemia:  -Will send repeat K STAT  -If level remains elevated, will administer Lokelma  -Will need to continue to closely monitor CMP    Chronic Renal Insufficiency:  -Should attempt to obtain medical records to evaluate trend of renal function  -eGFR= 5 (No baseline available)  -Cr= 8.35 (No baseline available)  -Will send Parathyroid Level and Lipid Panel  -Should obtain Bilateral Renal Sonogram  -May need to involve Nephro as patient likely requires HD or at least most likely will in the very near future    Troponinemia likely due to Type 2 MI:  -HEART Score= 3 (Age >=65, 1-2 Risk Factors), Low Score, Risk of MACE of 0.9-1.7%.  -Troponin= 0.062 (No baseline available)  -Will continue to trend troponin with simultaneous acquisition of EKG    Normocytic Anemia:  -Will send Retic Count    HTN:  -Will resume patient's home medications  -Vital Signs Q4H    Hypoalbuminemia:  -Nutrition Consult    GI/DVT PPx:  -Heparin  -Pepcid

## 2021-05-19 NOTE — CONSULT NOTE ADULT - PROBLEM SELECTOR RECOMMENDATION 2
Hx of Rheumatoid arthritis. With b/l UE and LE joint deformities.  S/p fall.  Pt stated she stopped taking RA medication 1 year ago due to effects on Kidneys.  C/o b/l knee pain, unable to quantify    -continue Tylenol for mild pain  -oxycodone 2.5 fro severe pain  -Lidoderm patch to b/l knees Hx of Rheumatoid arthritis. With b/l UE and LE joint deformities.  S/p fall.  Pt stated she stopped taking RA medication 1 year ago due to effects on Kidneys.  C/o b/l knee pain, unable to quantify    -continue Tylenol for mild pain  -oxycodone 2.5 mg po every 4 hours prn for  severe pain  -Lidoderm patch to b/l knees

## 2021-05-19 NOTE — CONSULT NOTE ADULT - ASSESSMENT
Confidential Drug Utilization Report  Search Terms: Yvette Mcgowan, 1940   Search Date: 05/19/2021 09:13:04 AM   The Drug Utilization Report below displays all of the controlled substance prescriptions, if any, that your patient has filled in the last twelve months. The information displayed on this report is compiled from pharmacy submissions to the Department, and accurately reflects the information as submitted by the pharmacies.  This report was requested by: Lilly Garrido | Reference #: [id number]   There are no results for the search terms that you entered.

## 2021-05-19 NOTE — ED PROVIDER NOTE - CARE PLAN
Principal Discharge DX:	NSTEMI (non-ST elevated myocardial infarction)  Secondary Diagnosis:	CKD (chronic kidney disease)  Secondary Diagnosis:	Hyperkalemia   Principal Discharge DX:	NSTEMI (non-ST elevated myocardial infarction)  Secondary Diagnosis:	CKD (chronic kidney disease)  Secondary Diagnosis:	Hyperkalemia  Secondary Diagnosis:	UTI (urinary tract infection)

## 2021-05-19 NOTE — CONSULT NOTE ADULT - ASSESSMENT
82 yo F with HTN and ESRD (refusing HD) who presented after a fall.    1. Borderline troponin elevation: Likely represents noncardiac elevation in troponin in setting of CKD. There is no clinical concern for acute cardiac etiology.   -Hypothyroidism may cause this as well; would obtain full set of TFTs and consider endocrinology evaluation    2. HTN:  Currently poorly controlled off all medications.   -Resume home doses of amlodipine, labetalol, isosorbide  -Check echocardiogram to assess for LVH    3. HLD: Patient is out of benefit range of statin for primary prevention of ASCVD due to age > 75    ***Note that this is a preliminary note and any recommendations should NOT be carried out until this note is finalized. *** 82 yo F with HTN and ESRD (refusing HD) who presented after a fall.    1. Borderline troponin elevation: Likely represents noncardiac elevation in troponin in setting of CKD. There is no clinical concern for acute cardiac etiology.   -Hypothyroidism may cause this as well; would obtain full set of TFTs and consider endocrinology evaluation  -Electrolytes have normalized, can discontinue telemetry    2. HTN:  Currently poorly controlled off all medications.   -Resume home doses of amlodipine, labetalol, isosorbide  -Check echocardiogram to assess for LVH    3. HLD: Patient is out of benefit range of statin for primary prevention of ASCVD due to age > 75

## 2021-05-19 NOTE — CONSULT NOTE ADULT - PROBLEM SELECTOR RECOMMENDATION 4
2/2 RA with pain to b/l knee pain.  S/p fall.  Pt was minimally ambulatory with walker prior to admission.  Currently bedbound, dependent.  High risk for skin failure.  Skin care per protocol.  Frequent positioning.  Pt eval

## 2021-05-19 NOTE — H&P ADULT - HISTORY OF PRESENT ILLNESS
82 y/o F pt with a PMHx of HTN, Rheumatoid Arthritis and Anemia presents to ED c/o b/l knee pain and fall this evening. Pt reports due to her RA she has severe knee pain and sometimes feels her knees are cracking and legs give away. Pt uses walker to ambulate at baseline. Pt reportedly had trouble walking due to severe knee pain today so her son sent her for an evaluation. Pt does have a home attendant from 1-5PM on weekdays and her son takes care of her. Pt son was working today when the pt fell while at home alone and required her neighbor to help her. Pt denies any other acute complaints. Allergies: Penicillin    Spoke to Son maria d over phone [phone] who reports patient was hospitalized 1 month ago at [facility] where she was told she had kidney problems but did not require dialysis at that time. He reports current meds include: amlodipine 10mg daily, labetalol 200mg daily, isosorbide 120mg daily, calcitrol and Vitamin D 82 y/o F pt from home lives with son, has HHA with a PMHx of HTN, Rheumatoid Arthritis and Anemia, kidney disease presents to ED c/o b/l knee pain and fall 5/18 evening. Pt reports due to her RA she has severe knee pain and sometimes feels her knees are cracking and legs give away. Pt uses walker to ambulate at baseline. Pt reportedly had trouble walking due to severe knee pain today so her son sent her for an evaluation. Pt does have a home attendant from 1-5PM on weekdays and her son takes care of her. Pt son was working today when the pt fell while at home alone and required her neighbor to help her. Pt denies any other acute complaints. Allergies: Penicillin  Pt was referred from rheumatologist to [facility] for kidney problem. Pt discussed dialysis option, but she doesn't want HD as "she doesn't believe in that". Nephrologist prescribed BP meds.  Pt states she stop taking prednisone 1 year ago as she feels steroid makes her condition worse in long term and stopped taking methotrexate 5 years ago. She takes only tylenol occasionally for her pain.    ED attending spoke to Lambert Callahan over phone [phone] reports current meds include: amlodipine 10mg daily, labetalol 200mg daily, isosorbide 120mg daily, calcitrol and Vitamin D.

## 2021-05-19 NOTE — CHART NOTE - NSCHARTNOTEFT_GEN_A_CORE
Paged by RN for /74, HR 69. Chart reviewed, pt is ESRD with eGFR 4, not amenable for dialysis. BP trend -200 for the past 12 hours.   Currently on Amlodipine 10, and labetalol increased to 200 today. Home medications included Imdur 120mg Qd, restarted for tomorrow AM.   Gave Hydralazine 5mg Iv push for now and will monitor. Paged by RN for /74, HR 69. Chart reviewed, pt is ESRD with eGFR 4, not amenable for dialysis. BP trend -200 for the past 12 hours.   Currently on Amlodipine 10, and labetalol increased to 200 today. Home medications included Imdur 120mg Qd, restarted for tomorrow AM.   Gave Hydralazine 5mg Iv push x2 during the nighttime. Paged by RN for /74, HR 69. Chart reviewed, pt is ESRD with eGFR 4, not amenable for dialysis. BP trend -200 for the past 12 hours.   Currently on Amlodipine 10, and labetalol increased to 200 today. Home medications included Imdur 120mg Qd, restarted for tomorrow AM.   Gave Hydralazine 5mg Iv push x2, and labetalol 10mg x1 during the nighttime.

## 2021-05-19 NOTE — H&P ADULT - PROBLEM SELECTOR PLAN 7
IMPROVE VTE Individual Risk Assessment  RISK                                                                Points  [  ] Previous VTE                                                  3  [  ] Thrombophilia                                               2  [  ] Lower limb paralysis                                      2        (unable to hold up >15 seconds)    [  ] Current Cancer                                              2         (within 6 months)  [x  ] Immobilization > 24 hrs                                1  [  ] ICU/CCU stay > 24 hours                              1  [x  ] Age > 60                                                      1  IMPROVE VTE Score ____2_____  heparin SC for DVT ppx

## 2021-05-19 NOTE — H&P ADULT - PROBLEM SELECTOR PLAN 2
P/w fall due to b/l knee pain  likely due to end-stage RA  Pt stop taking prednisone 1 year ago as she feels steroid makes her condition worse in long term and stopped taking methotrexate 5 years ago  Pain control - pt is refusing prednisone  f/u PT criss

## 2021-05-20 LAB
ALBUMIN SERPL ELPH-MCNC: 2.8 G/DL — LOW (ref 3.5–5)
ALP SERPL-CCNC: 76 U/L — SIGNIFICANT CHANGE UP (ref 40–120)
ALT FLD-CCNC: 14 U/L DA — SIGNIFICANT CHANGE UP (ref 10–60)
ANION GAP SERPL CALC-SCNC: 12 MMOL/L — SIGNIFICANT CHANGE UP (ref 5–17)
AST SERPL-CCNC: 29 U/L — SIGNIFICANT CHANGE UP (ref 10–40)
BILIRUB SERPL-MCNC: 0.4 MG/DL — SIGNIFICANT CHANGE UP (ref 0.2–1.2)
BUN SERPL-MCNC: 108 MG/DL — HIGH (ref 7–18)
CALCIUM SERPL-MCNC: 9.1 MG/DL — SIGNIFICANT CHANGE UP (ref 8.4–10.5)
CCP IGG SERPL-ACNC: 111 UNITS — HIGH
CHLORIDE SERPL-SCNC: 117 MMOL/L — HIGH (ref 96–108)
CO2 SERPL-SCNC: 15 MMOL/L — LOW (ref 22–31)
COVID-19 SPIKE DOMAIN AB INTERP: NEGATIVE — SIGNIFICANT CHANGE UP
COVID-19 SPIKE DOMAIN ANTIBODY RESULT: 0.4 U/ML — SIGNIFICANT CHANGE UP
CREAT SERPL-MCNC: 8.43 MG/DL — HIGH (ref 0.5–1.3)
GLUCOSE SERPL-MCNC: 83 MG/DL — SIGNIFICANT CHANGE UP (ref 70–99)
HCT VFR BLD CALC: 28.3 % — LOW (ref 34.5–45)
HGB BLD-MCNC: 9.3 G/DL — LOW (ref 11.5–15.5)
MAGNESIUM SERPL-MCNC: 2.2 MG/DL — SIGNIFICANT CHANGE UP (ref 1.6–2.6)
MCHC RBC-ENTMCNC: 26.7 PG — LOW (ref 27–34)
MCHC RBC-ENTMCNC: 32.9 GM/DL — SIGNIFICANT CHANGE UP (ref 32–36)
MCV RBC AUTO: 81.3 FL — SIGNIFICANT CHANGE UP (ref 80–100)
NRBC # BLD: 0 /100 WBCS — SIGNIFICANT CHANGE UP (ref 0–0)
PHOSPHATE SERPL-MCNC: 6 MG/DL — HIGH (ref 2.5–4.5)
PLATELET # BLD AUTO: 114 K/UL — LOW (ref 150–400)
POTASSIUM SERPL-MCNC: 4.6 MMOL/L — SIGNIFICANT CHANGE UP (ref 3.5–5.3)
POTASSIUM SERPL-SCNC: 4.6 MMOL/L — SIGNIFICANT CHANGE UP (ref 3.5–5.3)
PROT SERPL-MCNC: 6.7 G/DL — SIGNIFICANT CHANGE UP (ref 6–8.3)
RBC # BLD: 3.48 M/UL — LOW (ref 3.8–5.2)
RBC # FLD: 18.5 % — HIGH (ref 10.3–14.5)
RF+CCP IGG SER-IMP: ABNORMAL
SARS-COV-2 IGG+IGM SERPL QL IA: 0.4 U/ML — SIGNIFICANT CHANGE UP
SARS-COV-2 IGG+IGM SERPL QL IA: NEGATIVE — SIGNIFICANT CHANGE UP
SODIUM SERPL-SCNC: 144 MMOL/L — SIGNIFICANT CHANGE UP (ref 135–145)
THYROPEROXIDASE AB SERPL-ACNC: <10 IU/ML — SIGNIFICANT CHANGE UP
WBC # BLD: 5.35 K/UL — SIGNIFICANT CHANGE UP (ref 3.8–10.5)
WBC # FLD AUTO: 5.35 K/UL — SIGNIFICANT CHANGE UP (ref 3.8–10.5)

## 2021-05-20 PROCEDURE — 99233 SBSQ HOSP IP/OBS HIGH 50: CPT

## 2021-05-20 PROCEDURE — 99232 SBSQ HOSP IP/OBS MODERATE 35: CPT | Mod: GC

## 2021-05-20 RX ORDER — OXYCODONE HYDROCHLORIDE 5 MG/1
5 TABLET ORAL EVERY 4 HOURS
Refills: 0 | Status: DISCONTINUED | OUTPATIENT
Start: 2021-05-20 | End: 2021-05-21

## 2021-05-20 RX ORDER — ERYTHROPOIETIN 10000 [IU]/ML
4000 INJECTION, SOLUTION INTRAVENOUS; SUBCUTANEOUS ONCE
Refills: 0 | Status: COMPLETED | OUTPATIENT
Start: 2021-05-20 | End: 2021-05-20

## 2021-05-20 RX ORDER — HYDRALAZINE HCL 50 MG
5 TABLET ORAL ONCE
Refills: 0 | Status: COMPLETED | OUTPATIENT
Start: 2021-05-20 | End: 2021-05-20

## 2021-05-20 RX ORDER — LABETALOL HCL 100 MG
10 TABLET ORAL ONCE
Refills: 0 | Status: DISCONTINUED | OUTPATIENT
Start: 2021-05-20 | End: 2021-05-21

## 2021-05-20 RX ORDER — LIDOCAINE 4 G/100G
2 CREAM TOPICAL DAILY
Refills: 0 | Status: DISCONTINUED | OUTPATIENT
Start: 2021-05-20 | End: 2021-05-21

## 2021-05-20 RX ORDER — LABETALOL HCL 100 MG
200 TABLET ORAL
Refills: 0 | Status: DISCONTINUED | OUTPATIENT
Start: 2021-05-20 | End: 2021-05-21

## 2021-05-20 RX ORDER — OXYCODONE AND ACETAMINOPHEN 5; 325 MG/1; MG/1
1 TABLET ORAL EVERY 6 HOURS
Refills: 0 | Status: DISCONTINUED | OUTPATIENT
Start: 2021-05-20 | End: 2021-05-20

## 2021-05-20 RX ORDER — SEVELAMER CARBONATE 2400 MG/1
800 POWDER, FOR SUSPENSION ORAL
Refills: 0 | Status: DISCONTINUED | OUTPATIENT
Start: 2021-05-20 | End: 2021-05-21

## 2021-05-20 RX ADMIN — Medication 5 MILLIGRAM(S): at 00:32

## 2021-05-20 RX ADMIN — Medication 650 MILLIGRAM(S): at 15:15

## 2021-05-20 RX ADMIN — Medication 200 MILLIGRAM(S): at 18:31

## 2021-05-20 RX ADMIN — Medication 25 MICROGRAM(S): at 05:57

## 2021-05-20 RX ADMIN — HEPARIN SODIUM 5000 UNIT(S): 5000 INJECTION INTRAVENOUS; SUBCUTANEOUS at 14:23

## 2021-05-20 RX ADMIN — AMLODIPINE BESYLATE 10 MILLIGRAM(S): 2.5 TABLET ORAL at 05:57

## 2021-05-20 RX ADMIN — Medication 650 MILLIGRAM(S): at 14:23

## 2021-05-20 RX ADMIN — Medication 1300 MILLIGRAM(S): at 05:56

## 2021-05-20 RX ADMIN — LIDOCAINE 2 PATCH: 4 CREAM TOPICAL at 19:00

## 2021-05-20 RX ADMIN — Medication 1300 MILLIGRAM(S): at 18:30

## 2021-05-20 RX ADMIN — ISOSORBIDE MONONITRATE 120 MILLIGRAM(S): 60 TABLET, EXTENDED RELEASE ORAL at 12:15

## 2021-05-20 RX ADMIN — CEFTRIAXONE 100 MILLIGRAM(S): 500 INJECTION, POWDER, FOR SOLUTION INTRAMUSCULAR; INTRAVENOUS at 06:06

## 2021-05-20 RX ADMIN — OXYCODONE HYDROCHLORIDE 5 MILLIGRAM(S): 5 TABLET ORAL at 18:45

## 2021-05-20 RX ADMIN — HEPARIN SODIUM 5000 UNIT(S): 5000 INJECTION INTRAVENOUS; SUBCUTANEOUS at 05:57

## 2021-05-20 RX ADMIN — LIDOCAINE 2 PATCH: 4 CREAM TOPICAL at 12:15

## 2021-05-20 RX ADMIN — SEVELAMER CARBONATE 800 MILLIGRAM(S): 2400 POWDER, FOR SUSPENSION ORAL at 18:30

## 2021-05-20 RX ADMIN — HEPARIN SODIUM 5000 UNIT(S): 5000 INJECTION INTRAVENOUS; SUBCUTANEOUS at 21:41

## 2021-05-20 RX ADMIN — Medication 200 MILLIGRAM(S): at 05:57

## 2021-05-20 RX ADMIN — OXYCODONE HYDROCHLORIDE 5 MILLIGRAM(S): 5 TABLET ORAL at 19:25

## 2021-05-20 NOTE — PROGRESS NOTE ADULT - SUBJECTIVE AND OBJECTIVE BOX
[facility] NEPHROLOGY- PROGRESS NOTE    Patient is a 80yo Female with CKD-5, HTN 2/2 CKD, RA, Anemia p/w b/l knee pain s/p fall. Found to have advanced renal failure but refusing HD. Nephrology consulted for Elevated serum creatinine.    Hospital Medications: Medications reviewed.  REVIEW OF SYSTEMS:  CONSTITUTIONAL: No fevers or chills  RESPIRATORY: No shortness of breath  CARDIOVASCULAR: No chest pain.  GASTROINTESTINAL: No nausea, vomiting, diarrhea or abdominal pain.   VASCULAR: No bilateral lower extremity edema. +joint pain    VITALS:  T(F): 98 (21 @ 11:07), Max: 98.4 (21 @ 07:35)  HR: 67 (21 @ :07)  BP: 160/52 (21 @ 11:07)  RR: 18 (21 @ 11:07)  SpO2: 99% (21 11:07)  Wt(kg): --  Height (cm): 157.5 ( 04:30)  Weight (kg): 44.9 ( 04:30)  BMI (kg/m2): 18.1 ( 04:30)  BSA (m2): 1.42 ( 04:30)     @ 07:01  -   @ 07:00  --------------------------------------------------------  IN: 0 mL / OUT: 800 mL / NET: -800 mL     @ 07:01  -   @ 13:47  --------------------------------------------------------  IN: 200 mL / OUT: 0 mL / NET: 200 mL      PHYSICAL EXAM:  Gen: NAD, calm  HEENT: MMM  Neck: no JVD  Cards: RRR, +S1/S2, no M/G/R  Resp: CTA B/L  GI: soft, NT/ND, NABS  : +child  Extremities: no LE edema B/L  b/l hand deformities from RA  Neuro: AOX 3 and able to repeat back the reason for her hospitalization and the need for HD      LABS:      144  |  117<H>  |  108<H>  ----------------------------<  83  4.6   |  15<L>  |  8.43<H>    Ca    9.1      20 May 2021 08:34  Phos  6.0       Mg     2.2         TPro  6.7  /  Alb  2.8<L>  /  TBili  0.4  /  DBili      /  AST  29  /  ALT  14  /  AlkPhos  76      Creatinine Trend: 8.43 <--, 8.07 <--, 8.35 <--                        9.3    5.35  )-----------( 114      ( 20 May 2021 08:34 )             28.3     Urine Studies:  Urinalysis Basic - ( 19 May 2021 02:26 )    Color: Yellow / Appearance: Clear / S.015 / pH:   Gluc:  / Ketone: Negative  / Bili: Negative / Urobili: Negative   Blood:  / Protein: 500 mg/dL / Nitrite: Negative   Leuk Esterase: Moderate / RBC: 5-10 /HPF / WBC 11-25 /HPF   Sq Epi:  / Non Sq Epi: Few /HPF / Bacteria: Moderate /HPF      Sodium, Random Urine: 54 mmol/L ( @ 05:50)  Creatinine, Random Urine: 71 mg/dL ( @ 05:50)    RADIOLOGY & ADDITIONAL STUDIES:

## 2021-05-20 NOTE — PROGRESS NOTE ADULT - NSGCTIMESPENT_GEN_ALL_CORE
Date of Service:  2017    Dr. Hernandez / Internal Medicine Clinic / [facility] [place]    Chief Complaint:  Right leg issues;     History of Present Illness:  51 year old  male who presents for evaluation of the followin.  Right leg issues:  Pain in anterior upper leg; worse with walking 1 miles; no associated back pain; improved with rest; will feel numb from right knee distally; right knee may feel as if buckling; no trauma;  Going on x 9 months; no right calf pain;    2.  Right neck pain:  patient questions if more of lymphadenopathy;  CT Chest 2017 did not show any lymphadenopathy.      Past Medical History:   Diagnosis Date   • Mccormick's esophagus    • Essential (primary) hypertension    • GERD (gastroesophageal reflux disease)    • Hodgkin lymphoma (CMS/formerly [facility]) 85, 98   • Other and unspecified hyperlipidemia    • S/P CABG x 2        Current Outpatient Prescriptions   Medication Sig   • atorvastatin (LIPITOR) 40 MG tablet Take 1 tablet by mouth daily.   • Coenzyme Q10 (COQ-10 PO) Take by mouth daily.   • cholecalciferol (VITAMIN D3) 1000 UNITS tablet Take 1,000 Units by mouth daily.   • aspirin 81 MG chewable tablet Chew 81 mg by mouth daily.   • omeprazole (PRILOSEC) 20 MG capsule Take 20 mg by mouth daily.   • Multiple Vitamins-Minerals (MULTIVITAMIN & MINERAL PO) Take  by mouth.     No current facility-administered medications for this visit.        Allergies:    Allergies as of 2017   • (No Known Allergies)       History   Smoking Status   • Never Smoker   Smokeless Tobacco   • Never Used       Review of Systems:  Weight Stable otherwise 14 point review of systems negative except as above.    Physical Examination:    Vitals:    17 1620   BP: 126/82   Pulse: 80   Resp: 16   Temp: 97.5 °F (36.4 °C)   TempSrc: Temporal Artery   Weight: 89.1 kg    Body mass index is 28.19 kg/(m^2).    Pleasant male in no acute distress  Normal appearance; alert and Oriented times  three;  HEENT:  Oral mucosal membranes moist; anicteric; normal lids and conjunctivae; neck supple with full range of motion;  normal pupils; no cervical adenopathy; trachea midline; posterior pharynx without erythema or exudates; symmetric movement of pharynx; normal bilateral external ears and nose; thyroid normal; normal hearing;  Cardiac:  Regular rate and rhythm; no murmur, gallop or rubs.  Lungs:  Clear to auscultation and percussion; good air movement; no wheezing, egophony or rhonchi.  Abdomen:  Soft, nontender, nondistended; no hepatosplenomegaly; no masses.  Lower Extremities:  Warm, dry, no edema, no rashes.  Dorsalis pedis and posterior tibialis pulses 1+ bilateral lower extremities.    Male Genitourinary Exam:  Appearance:  Normal circumcised male genitalia; no lesions on visual inspection except ? Wart left proximal shaft  Testes:  normal on Right;  normal on Left  Epididymis:   normal on Right;  nromal on Left  Assessment for inguinal hernia:  patulent on left;  ? On right with small hernia easily  Reduced  nonTender to palpation over:  Entire spine;  No erythema/warmth or fluctuance;  Motor  5/5 bilateral lower extremities  Deep tendon reflexes:  1+ bilateral knees and ankles   Straight leg raise:  negative  Gait:  normal  Flexion Abduction External Rotation    Assessment:  1.  ? Of right inguinal hernia but patient would like to monitor  2.  Right leg pain:  Not consistent with  Claudication;  ? More L2 or L3 radicular symptoms       ICD-10-CM   1. Right inguinal pain R10.30   2. Right leg pain M79.604       Plan: No orders of the defined types were placed in this encounter.     Stretching exercises; aware of symptoms of incarceration;  Call if progression;  Has complete physical examination this summer  Follow up if lack of resolution or progression of symptoms, as previously directed, or sooner based on symptoms  Declines referral to general surgery;    Roman Hernandez M.D.   30

## 2021-05-20 NOTE — PROGRESS NOTE ADULT - ATTENDING COMMENTS
c/o b/l knee pain - oxycodone added. family meeting today regarding GOC. HD initiation v/s Hospice
Pt without new complaints. At this time, family wishes to take patient home and f/u with nephrology as outpt for further discussion. DNR/DNI on file.

## 2021-05-20 NOTE — PROGRESS NOTE ADULT - PROBLEM SELECTOR PLAN 5
Met with both of the pt's sons at the bedside discussed her current clinical condition.  Pt's sons are unrealistic as to her overall poor prognosis.  They have decided not to pursue any further medical interventions here at [facility].  They would like her to be discharged ASAP, to they may take her to see an out pt nephrologist at the [facility] a well as a nutritionist.     They explained they believe if the pt's diet was changed, it may improve her kidney function.         Plan discussed with the primary team. Met with both of the pt's sons at the bedside discussed her current clinical condition.  Pt's sons are unrealistic as to her overall poor prognosis.  They have decided not to pursue any further medical interventions here at [facility].  They would like her to be discharged ASAP, to they may take her to see out pt nephrologist at the [facility] they have a 1pm appointment scheduled for 5/21.  They also have an appointment with a nutritionist.     They explained they believe if the pt's diet was changed, to include berries and natural ingredients it may improve her kidney function.   All questions answered.  Support provided.       Plan discussed with the primary team. [facility] discussion as above.

## 2021-05-20 NOTE — PROGRESS NOTE ADULT - PROBLEM SELECTOR PLAN 2
P/w fall due to b/l knee pain  likely due to end-stage RA  Pt stop taking prednisone 1 year ago as she feels steroid makes her condition worse in long term and stopped taking methotrexate 5 years ago  Pain control - pt is refusing prednisone  f/u PT criss
Hx of Rheumatoid arthritis. With b/l UE and LE joint deformities.  S/p fall.  Pt stated she stopped taking RA medication 1 year ago due to effects on Kidneys.  C/o b/l knee pain, unable to quantify    -continue Tylenol for mild pain  -oxycodone 2.5 mg po every 4 hours prn for  severe pain  -Lidoderm patch to b/l knees.

## 2021-05-20 NOTE — PROGRESS NOTE ADULT - PROBLEM SELECTOR PLAN 1
- P/w Cr 8.35  - baseline unknown, but she was evaluated in [facility] for kidney dysfunction 1 mon ago  - Pt is refusing HD   - s/p danyell, pt is making urine  f/u urine study  f/u  FeNa   Nephro Dr. Tim group is consulted  Palliative is consulted as pt is refusing HD

## 2021-05-20 NOTE — DIETITIAN INITIAL EVALUATION ADULT. - OTHER INFO
Reports losing 29% from usual in 1 year(61 to 43kg) . patient with decreased renal function but refusing dialysis. has stomach pain in general. dinner tray untouched. agreed to try nepro oral supplement. Provide soft, DASH/TLC, renal replacement as ordered. Add nepro 1 can/d to approximate needs.

## 2021-05-20 NOTE — CHART NOTE - NSCHARTNOTEFT_GEN_A_CORE
Spoke with the pt's son London Mcgowan this AM, he stated the family needs more time to discuss HD.  Palliative care will follow.

## 2021-05-20 NOTE — DIETITIAN NUTRITION RISK NOTIFICATION - TREATMENT: THE FOLLOWING DIET HAS BEEN RECOMMENDED
Diet, Soft:   DASH/TLC {Sodium & Cholesterol Restricted}  For patients receiving Renal Replacement - No Protein Restr, No Conc K, No Conc Phos, Low Sodium (RENAL)  Supplement Feeding Modality:  Oral  Nepro Cans or Servings Per Day:  1       Frequency:  Daily (05-20-21 @ 17:31) [Pending Verification By Attending]

## 2021-05-20 NOTE — PROGRESS NOTE ADULT - PROBLEM SELECTOR PLAN 4
2/2 RA with pain to b/l knee pain.  S/p fall.  Pt was minimally ambulatory with walker prior to admission.  Currently bedbound, dependent.  High risk for skin failure.  Skin care per protocol.  Frequent positioning.  Pt eval.
p/w K 5.4  Likely from CKD  s/p lokelma 10mg in ED  f/u BMP

## 2021-05-20 NOTE — PHYSICAL THERAPY INITIAL EVALUATION ADULT - GENERAL OBSERVATIONS, REHAB EVAL
Pt found in bed in NAD with heart monitor and child in situ, sons by bedside. Pt is A&oriented to person, place and time ( May 2021) and situation ( im here to get better)

## 2021-05-20 NOTE — PHYSICAL THERAPY INITIAL EVALUATION ADULT - ACTIVE RANGE OF MOTION EXAMINATION, REHAB EVAL
bilat shoulder elevation to WFL,elbow WFL, wrist and fingers decreased AROM due to severe deformity/bilateral upper extremity Active ROM was WFL (within functional limits)/bilateral  lower extremity Active ROM was WFL (within functional limits)

## 2021-05-20 NOTE — PROGRESS NOTE ADULT - CONVERSATION DETAILS
Met with both of the pt's sons at the bedside discussed her current clinical condition.  Pt's sons are unrealistic as to her overall poor prognosis.  They have decided not to pursue any further medical interventions here at [facility].  They would like her to be discharged ASAP, to they may take her to see out pt nephrologist at the [facility] they have a 1pm appointment scheduled for 5/21.  They also have an appointment with a nutritionist.     They explained they believe if the pt's diet was changed, to include berries and natural ingredients it may improve her kidney function.   All questions answered.  Support provided.

## 2021-05-20 NOTE — DIETITIAN INITIAL EVALUATION ADULT. - PERTINENT LABORATORY DATA
05-20 Na144 mmol/L Glu 83 mg/dL K+ 4.6 mmol/L Cr  8.43 mg/dL<H>  mg/dL<H> 05-20 Phos 6.0 mg/dL<H> 05-20 Alb 2.8 g/dL<L> 05-19 Chol 233 mg/dL<H> LDL --    HDL 74 mg/dL Trig 71 mg/dL

## 2021-05-20 NOTE — PROGRESS NOTE ADULT - SUBJECTIVE AND OBJECTIVE BOX
PGY-1 Progress Note discussed with attending    PAGER #: [[phone]] TILL 5:00 PM  PLEASE CONTACT ON CALL TEAM:  - On Call Team (Please refer to Luis) FROM 5:00 PM - 8:30PM  - Nightfloat Team FROM 8:30 -7:30 AM    INTERVAL HPI/OVERNIGHT EVENTS:   No adverse events overnight. Pt still has knee pain, started on oxycodone and fentanyl patches.     REVIEW OF SYSTEMS:  CONSTITUTIONAL: No fever, weight loss, or fatigue  RESPIRATORY: No cough, wheezing, chills or hemoptysis; No shortness of breath  CARDIOVASCULAR: No chest pain, palpitations, dizziness, or leg swelling  GASTROINTESTINAL: No abdominal pain. No nausea, vomiting, or hematemesis; No diarrhea or constipation. No melena or hematochezia.  GENITOURINARY: No dysuria or hematuria, urinary frequency  NEUROLOGICAL: No headaches, memory loss, loss of strength, numbness, or tremors  SKIN: No itching, burning, rashes, or lesions     Vital Signs Last 24 Hrs  T(C): 36.7 (20 May 2021 11:07), Max: 36.9 (20 May 2021 07:35)  T(F): 98 (20 May 2021 11:07), Max: 98.4 (20 May 2021 07:35)  HR: 67 (20 May 2021 11:07) (59 - 71)  BP: 160/52 (20 May 2021 11:07) (139/49 - 208/79)  BP(mean): --  RR: 18 (20 May 2021 11:07) (18 - 18)  SpO2: 99% (20 May 2021 11:07) (96% - 100%)    PHYSICAL EXAMINATION:  GENERAL: NAD, well built  HEAD:  Atraumatic, Normocephalic  EYES:  conjunctiva and sclera clear  NECK: Supple, No JVD, Normal thyroid  CHEST/LUNG: Clear to auscultation. Clear to percussion bilaterally; No rales, rhonchi, wheezing, or rubs  HEART: Regular rate and rhythm; No murmurs, rubs, or gallops  ABDOMEN: Soft, Nontender, Nondistended; Bowel sounds present  NERVOUS SYSTEM:  Alert & Oriented X3,    EXTREMITIES:  2+ Peripheral Pulses, No clubbing, cyanosis, or edema  SKIN: warm dry                          9.3    5.35  )-----------( 114      ( 20 May 2021 08:34 )             28.3     05-20    144  |  117<H>  |  108<H>  ----------------------------<  83  4.6   |  15<L>  |  8.43<H>    Ca    9.1      20 May 2021 08:34  Phos  6.0     05-20  Mg     2.2     05-20    TPro  6.7  /  Alb  2.8<L>  /  TBili  0.4  /  DBili  x   /  AST  29  /  ALT  14  /  AlkPhos  76  05-20    LIVER FUNCTIONS - ( 20 May 2021 08:34 )  Alb: 2.8 g/dL / Pro: 6.7 g/dL / ALK PHOS: 76 U/L / ALT: 14 U/L DA / AST: 29 U/L / GGT: x           CARDIAC MARKERS ( 19 May 2021 05:51 )  0.061 ng/mL / x     / x     / x     / x      CARDIAC MARKERS ( 19 May 2021 00:33 )  0.062 ng/mL / x     / x     / x     / x        CAPILLARY BLOOD GLUCOSE    RADIOLOGY & ADDITIONAL TESTS:

## 2021-05-20 NOTE — PROGRESS NOTE ADULT - PROBLEM SELECTOR PLAN 3
2/2 comorbidities.  RA and CKD.  Bitemporal wasting with muscle mass/fat loss.  Pt reports poor po intake, with unintended weight loss. Albumin 2.8.  Diet as tolerated. High risk for skin failure.   Nutrition consult.
- positive UA   - afebrile   - Pt has dysuria , increased urinary frequency  - Will start with rocephine 1g daily  - f/u urine cultures (specimen received)

## 2021-05-20 NOTE — DIETITIAN INITIAL EVALUATION ADULT. - PROBLEM SELECTOR PLAN 1
- P/w Cr 8.35  - baseline unknown, but she was evaluated in [facility] for kidney dysfunction 1 mon ago  - Pt is refusing HD   - s/p danyell, pt is making urine  - f/u PTH  - f/u BMP   f/u urine study  f/u  FeNa   Nephro Dr. Tim group is consulted  Palliative is consulted as pt is refusing HD

## 2021-05-20 NOTE — PHYSICAL THERAPY INITIAL EVALUATION ADULT - DIAGNOSIS, PT EVAL
PT exam demonstrates no focal deficits, but has generalize weakness x4 E,  balance and decreased endurance with pain in bilat wrist/hands and bilat knees

## 2021-05-20 NOTE — DIETITIAN INITIAL EVALUATION ADULT. - CALCULATED TO (G/KG)
09/04/20        Patient: Leopold Mosses   YOB: 1975   Date of Visit: 9/4/2020       Dear  Dr. Denver Risser, MD,      Thank you for referring Leopold Mosses to my practice.   She presented with complaints of chronic itchy e
44.9

## 2021-05-20 NOTE — PROGRESS NOTE ADULT - PROBLEM SELECTOR PLAN 1
CARLITO on CKD.  Worsening Scr, GFR 5. Per PMD BUN 47 and Scr 1.99 in 2019.   Nephrology consult    Pt stated she does not want HD.  Unable to commit to further goals of care including hospice.    Discussed hospice philosophy and locations of care.  Pt sons wants the pt to be discharged ASAP so they may follow up with out pt MD.    Pt is DNR/DNI        Avoid NSAIDs. CARLITO on CKD.  Worsening Scr, GFR 5. Per PMD BUN 47 and Scr 1.99 in 2019.   Nephrology consult    Pt stated she does not want HD.  Unable to commit to further goals of care including hospice.    Discussed hospice philosophy and locations of care.  Pt sons wants the pt to be discharged home ASAP so they may follow up with out pt MD.    Pt is DNR/DNI        Avoid NSAIDs.

## 2021-05-20 NOTE — PROGRESS NOTE ADULT - ASSESSMENT
Patient is a 82yo Female with CKD-5, HTN 2/2 CKD, RA, Anemia p/w b/l knee pain s/p fall. Found to have advanced renal failure but refusing HD. Nephrology consulted for Elevated serum creatinine.      1. CKD-5- as per pt; last SCr ~5 and pt follows with Nephrologist Dr. Chevalier James. Pt with worsening renal function; now ESRD. Pt was told in the past she needs HD in the future but has declined.   Kera Sawant NP and I,  spoke with patient, her son London and her ex  (via phone) on 5/19. Discussed patients medical condition including worsening renal function and the need for HD. Discussed risk/ benefits/ alternative of RRT and different modalities of RRT. Pt initially refused RRT and remains undecided. Discussed with patient that a decision needs to be made now due to declining renal function. As per pt, she will let me know her decision by tomorrow 5/21. Discussed the option of comfort care/ hospice if pt decides to decline HD. Strict I/Os. c/w Renal diet. Avoid nephrotoxins/ NSAIDs/ RCA. Monitor BMP.    2. Anemia of renal dz- low hgb. Will avoid venofer in the setting of elevated tsat/ ferritin. Will give Epogen 4k units SC x1. Monitor hgb    3. HTN 2/2 CKD- uncontrolled BP- recc to increase Labetalol to 200mg PO bid, titrate as needed. c/w Amlodipine. c/w low salt diet. Monitor BP    4. Secondary HPT- PTHi 157 c/w calcitriol. Elevated serum phos- c/w low phos diet. Will add Renvela 800mg PO tid with meals. Monitor serum phos and serum calcium.     5. Metabolic acidosis- in the setting of renal failure. Serum CO2 improving on sodium bicarb 1300mg PO bid. Monitor serum  CO2.     6. Hyperkalemia- resolved. c/w low K diet. Can give Lokelma prn. Monitor serum K.       [facility] NEPHROLOGY  Dread Tim M.D.  Carrillo Nguyen D.O.  Madalny Olivares M.D.  Yodit Carlos, MSN, ANP-C  [phone]    [address] [place] [address]  
82 y/o F pt from home lives with son, has HHA with a PMHx of HTN, Rheumatoid Arthritis and Anemia, kidney disease presents to ED c/o b/l knee pain and fall 5/18 evening. Pt is admitted for CKD/CARLITO and ambulatory dysfunction.    Lambert Callahan over phone [phone]     Rheumatologist : Dr. Rajeev Abarca [phone] (?)  nephrologist : Dr. Chevalier James [phone]

## 2021-05-20 NOTE — PROGRESS NOTE ADULT - SUBJECTIVE AND OBJECTIVE BOX
OVERNIGHT EVENTS: Worsening Scr.      Present Symptoms:   Dyspnea: denies  Nausea/Vomiting: denies  Loss of appetite: yes  Pain:   yes                             location:  b/l knees        Review of Systems: [All others negative     MEDICATIONS  (STANDING):  amLODIPine   Tablet 10 milliGRAM(s) Oral daily  cefTRIAXone   IVPB 1000 milliGRAM(s) IV Intermittent every 24 hours  epoetin adriano-epbx (RETACRIT) Injectable 4000 Unit(s) SubCutaneous once  heparin   Injectable 5000 Unit(s) SubCutaneous every 8 hours  isosorbide   mononitrate ER Tablet (IMDUR) 120 milliGRAM(s) Oral daily  labetalol 200 milliGRAM(s) Oral two times a day  labetalol Injectable 10 milliGRAM(s) IV Push once  levothyroxine 25 MICROGram(s) Oral daily  lidocaine   Patch 2 Patch Transdermal daily  sevelamer carbonate 800 milliGRAM(s) Oral three times a day with meals  sodium bicarbonate 1300 milliGRAM(s) Oral two times a day    MEDICATIONS  (PRN):  acetaminophen   Tablet .. 650 milliGRAM(s) Oral every 6 hours PRN Mild Pain (1 - 3)  oxyCODONE    IR 5 milliGRAM(s) Oral every 4 hours PRN Severe Pain (7 - 10)      PHYSICAL EXAM:  Vital Signs Last 24 Hrs  T(C): 36.4 (20 May 2021 15:49), Max: 36.9 (20 May 2021 07:35)  T(F): 97.5 (20 May 2021 15:49), Max: 98.4 (20 May 2021 07:35)  HR: 65 (20 May 2021 15:49) (59 - 71)  BP: 168/61 (20 May 2021 15:49) (139/49 - 208/79)  BP(mean): --  RR: 18 (20 May 2021 15:49) (18 - 18)  SpO2: 100% (20 May 2021 15:49) (96% - 100%)    General: Fragile elderly woman, AOX3  Karnofsky Performance Score/Palliative Performance Status Version2: 40    %    HEENT: bitemporal wasting, moist mucous membrane  Lungs: unlabored on RA  CV: RRR  GI: soft, non tender on palpation  : urinating  Musculoskeletal: b/l  UE and LE deformities   Skin: no rash or lesions noted  Neuro: no deficits cognitive impairment   Oral intake ability: poor po intake      LABS:                          9.3    5.35  )-----------( 114      ( 20 May 2021 08:34 )             28.3     05-    144  |  117<H>  |  108<H>  ----------------------------<  83  4.6   |  15<L>  |  8.43<H>    Ca    9.1      20 May 2021 08:34  Phos  6.0     -  Mg     2.2         TPro  6.7  /  Alb  2.8<L>  /  TBili  0.4  /  DBili  x   /  AST  29  /  ALT  14  /  AlkPhos  76  05-20    Urinalysis Basic - ( 19 May 2021 02:26 )    Color: Yellow / Appearance: Clear / S.015 / pH: x  Gluc: x / Ketone: Negative  / Bili: Negative / Urobili: Negative   Blood: x / Protein: 500 mg/dL / Nitrite: Negative   Leuk Esterase: Moderate / RBC: 5-10 /HPF / WBC 11-25 /HPF   Sq Epi: x / Non Sq Epi: Few /HPF / Bacteria: Moderate /HPF        RADIOLOGY & ADDITIONAL STUDIES: Reviewed    ADVANCE DIRECTIVES: DNR/DNI     OVERNIGHT EVENTS: Worsening Scr.  Pt's son's Elsa present at the bedside     Present Symptoms:   Dyspnea: denies  Nausea/Vomiting: denies  Loss of appetite: yes  Pain:   yes                             location:  b/l knees        Review of Systems: [All others negative     MEDICATIONS  (STANDING):  amLODIPine   Tablet 10 milliGRAM(s) Oral daily  cefTRIAXone   IVPB 1000 milliGRAM(s) IV Intermittent every 24 hours  epoetin adriano-epbx (RETACRIT) Injectable 4000 Unit(s) SubCutaneous once  heparin   Injectable 5000 Unit(s) SubCutaneous every 8 hours  isosorbide   mononitrate ER Tablet (IMDUR) 120 milliGRAM(s) Oral daily  labetalol 200 milliGRAM(s) Oral two times a day  labetalol Injectable 10 milliGRAM(s) IV Push once  levothyroxine 25 MICROGram(s) Oral daily  lidocaine   Patch 2 Patch Transdermal daily  sevelamer carbonate 800 milliGRAM(s) Oral three times a day with meals  sodium bicarbonate 1300 milliGRAM(s) Oral two times a day    MEDICATIONS  (PRN):  acetaminophen   Tablet .. 650 milliGRAM(s) Oral every 6 hours PRN Mild Pain (1 - 3)  oxyCODONE    IR 5 milliGRAM(s) Oral every 4 hours PRN Severe Pain (7 - 10)      PHYSICAL EXAM:  Vital Signs Last 24 Hrs  T(C): 36.4 (20 May 2021 15:49), Max: 36.9 (20 May 2021 07:35)  T(F): 97.5 (20 May 2021 15:49), Max: 98.4 (20 May 2021 07:35)  HR: 65 (20 May 2021 15:49) (59 - 71)  BP: 168/61 (20 May 2021 15:49) (139/49 - 208/79)  BP(mean): --  RR: 18 (20 May 2021 15:49) (18 - 18)  SpO2: 100% (20 May 2021 15:49) (96% - 100%)    General: Fragile elderly woman, AOX3  Karnofsky Performance Score/Palliative Performance Status Version2: 40    %    HEENT: bitemporal wasting, moist mucous membrane  Lungs: unlabored on RA  CV: RRR  GI: soft, non tender on palpation  : urinating  Musculoskeletal: b/l  UE and LE deformities   Skin: no rash or lesions noted  Neuro: no deficits cognitive impairment   Oral intake ability: poor po intake      LABS:                          9.3    5.35  )-----------( 114      ( 20 May 2021 08:34 )             28.3     05-    144  |  117<H>  |  108<H>  ----------------------------<  83  4.6   |  15<L>  |  8.43<H>    Ca    9.1      20 May 2021 08:34  Phos  6.0       Mg     2.2         TPro  6.7  /  Alb  2.8<L>  /  TBili  0.4  /  DBili  x   /  AST  29  /  ALT  14  /  AlkPhos  76      Urinalysis Basic - ( 19 May 2021 02:26 )    Color: Yellow / Appearance: Clear / S.015 / pH: x  Gluc: x / Ketone: Negative  / Bili: Negative / Urobili: Negative   Blood: x / Protein: 500 mg/dL / Nitrite: Negative   Leuk Esterase: Moderate / RBC: 5-10 /HPF / WBC 11-25 /HPF   Sq Epi: x / Non Sq Epi: Few /HPF / Bacteria: Moderate /HPF        RADIOLOGY & ADDITIONAL STUDIES: Reviewed    ADVANCE DIRECTIVES: DNR/DNI

## 2021-05-21 ENCOUNTER — TRANSCRIPTION ENCOUNTER (OUTPATIENT)
Age: 81
End: 2021-05-21

## 2021-05-21 VITALS
DIASTOLIC BLOOD PRESSURE: 59 MMHG | HEART RATE: 57 BPM | OXYGEN SATURATION: 99 % | RESPIRATION RATE: 18 BRPM | TEMPERATURE: 97 F | SYSTOLIC BLOOD PRESSURE: 168 MMHG

## 2021-05-21 LAB
-  AMIKACIN: SIGNIFICANT CHANGE UP
-  AMOXICILLIN/CLAVULANIC ACID: SIGNIFICANT CHANGE UP
-  AMPICILLIN/SULBACTAM: SIGNIFICANT CHANGE UP
-  AMPICILLIN: SIGNIFICANT CHANGE UP
-  AZTREONAM: SIGNIFICANT CHANGE UP
-  CEFAZOLIN: SIGNIFICANT CHANGE UP
-  CEFEPIME: SIGNIFICANT CHANGE UP
-  CEFOXITIN: SIGNIFICANT CHANGE UP
-  CEFTRIAXONE: SIGNIFICANT CHANGE UP
-  CIPROFLOXACIN: SIGNIFICANT CHANGE UP
-  ERTAPENEM: SIGNIFICANT CHANGE UP
-  GENTAMICIN: SIGNIFICANT CHANGE UP
-  LEVOFLOXACIN: SIGNIFICANT CHANGE UP
-  MEROPENEM: SIGNIFICANT CHANGE UP
-  NITROFURANTOIN: SIGNIFICANT CHANGE UP
-  PIPERACILLIN/TAZOBACTAM: SIGNIFICANT CHANGE UP
-  TOBRAMYCIN: SIGNIFICANT CHANGE UP
-  TRIMETHOPRIM/SULFAMETHOXAZOLE: SIGNIFICANT CHANGE UP
CULTURE RESULTS: SIGNIFICANT CHANGE UP
HBV SURFACE AG SER-ACNC: SIGNIFICANT CHANGE UP
METHOD TYPE: SIGNIFICANT CHANGE UP
ORGANISM # SPEC MICROSCOPIC CNT: SIGNIFICANT CHANGE UP
ORGANISM # SPEC MICROSCOPIC CNT: SIGNIFICANT CHANGE UP
SPECIMEN SOURCE: SIGNIFICANT CHANGE UP

## 2021-05-21 PROCEDURE — 84145 PROCALCITONIN (PCT): CPT

## 2021-05-21 PROCEDURE — 71045 X-RAY EXAM CHEST 1 VIEW: CPT

## 2021-05-21 PROCEDURE — 86200 CCP ANTIBODY: CPT

## 2021-05-21 PROCEDURE — 82607 VITAMIN B-12: CPT

## 2021-05-21 PROCEDURE — 80053 COMPREHEN METABOLIC PANEL: CPT

## 2021-05-21 PROCEDURE — 86376 MICROSOMAL ANTIBODY EACH: CPT

## 2021-05-21 PROCEDURE — 84100 ASSAY OF PHOSPHORUS: CPT

## 2021-05-21 PROCEDURE — 86140 C-REACTIVE PROTEIN: CPT

## 2021-05-21 PROCEDURE — 87086 URINE CULTURE/COLONY COUNT: CPT

## 2021-05-21 PROCEDURE — 84443 ASSAY THYROID STIM HORMONE: CPT

## 2021-05-21 PROCEDURE — 82570 ASSAY OF URINE CREATININE: CPT

## 2021-05-21 PROCEDURE — 83036 HEMOGLOBIN GLYCOSYLATED A1C: CPT

## 2021-05-21 PROCEDURE — 83540 ASSAY OF IRON: CPT

## 2021-05-21 PROCEDURE — 84550 ASSAY OF BLOOD/URIC ACID: CPT

## 2021-05-21 PROCEDURE — 80048 BASIC METABOLIC PNL TOTAL CA: CPT

## 2021-05-21 PROCEDURE — 81001 URINALYSIS AUTO W/SCOPE: CPT

## 2021-05-21 PROCEDURE — 84300 ASSAY OF URINE SODIUM: CPT

## 2021-05-21 PROCEDURE — 99285 EMERGENCY DEPT VISIT HI MDM: CPT | Mod: 25

## 2021-05-21 PROCEDURE — 97162 PT EVAL MOD COMPLEX 30 MIN: CPT

## 2021-05-21 PROCEDURE — 80061 LIPID PANEL: CPT

## 2021-05-21 PROCEDURE — 85025 COMPLETE CBC W/AUTO DIFF WBC: CPT

## 2021-05-21 PROCEDURE — 86769 SARS-COV-2 COVID-19 ANTIBODY: CPT

## 2021-05-21 PROCEDURE — 82746 ASSAY OF FOLIC ACID SERUM: CPT

## 2021-05-21 PROCEDURE — 99239 HOSP IP/OBS DSCHRG MGMT >30: CPT

## 2021-05-21 PROCEDURE — 82310 ASSAY OF CALCIUM: CPT

## 2021-05-21 PROCEDURE — 93005 ELECTROCARDIOGRAM TRACING: CPT

## 2021-05-21 PROCEDURE — 86038 ANTINUCLEAR ANTIBODIES: CPT

## 2021-05-21 PROCEDURE — 87186 SC STD MICRODIL/AGAR DIL: CPT

## 2021-05-21 PROCEDURE — 82652 VIT D 1 25-DIHYDROXY: CPT

## 2021-05-21 PROCEDURE — 83970 ASSAY OF PARATHORMONE: CPT

## 2021-05-21 PROCEDURE — 36415 COLL VENOUS BLD VENIPUNCTURE: CPT

## 2021-05-21 PROCEDURE — 84560 ASSAY OF URINE/URIC ACID: CPT

## 2021-05-21 PROCEDURE — 84439 ASSAY OF FREE THYROXINE: CPT

## 2021-05-21 PROCEDURE — 82306 VITAMIN D 25 HYDROXY: CPT

## 2021-05-21 PROCEDURE — 83735 ASSAY OF MAGNESIUM: CPT

## 2021-05-21 PROCEDURE — 84156 ASSAY OF PROTEIN URINE: CPT

## 2021-05-21 PROCEDURE — 73562 X-RAY EXAM OF KNEE 3: CPT

## 2021-05-21 PROCEDURE — 85045 AUTOMATED RETICULOCYTE COUNT: CPT

## 2021-05-21 PROCEDURE — 83615 LACTATE (LD) (LDH) ENZYME: CPT

## 2021-05-21 PROCEDURE — 84484 ASSAY OF TROPONIN QUANT: CPT

## 2021-05-21 PROCEDURE — 87635 SARS-COV-2 COVID-19 AMP PRB: CPT

## 2021-05-21 PROCEDURE — 82728 ASSAY OF FERRITIN: CPT

## 2021-05-21 PROCEDURE — 85027 COMPLETE CBC AUTOMATED: CPT

## 2021-05-21 PROCEDURE — 85652 RBC SED RATE AUTOMATED: CPT

## 2021-05-21 PROCEDURE — 87340 HEPATITIS B SURFACE AG IA: CPT

## 2021-05-21 PROCEDURE — 83550 IRON BINDING TEST: CPT

## 2021-05-21 PROCEDURE — 87077 CULTURE AEROBIC IDENTIFY: CPT

## 2021-05-21 RX ORDER — AMLODIPINE BESYLATE 2.5 MG/1
1 TABLET ORAL
Qty: 0 | Refills: 0 | DISCHARGE

## 2021-05-21 RX ORDER — ISOSORBIDE MONONITRATE 60 MG/1
1 TABLET, EXTENDED RELEASE ORAL
Qty: 0 | Refills: 0 | DISCHARGE

## 2021-05-21 RX ORDER — LABETALOL HCL 100 MG
1 TABLET ORAL
Qty: 0 | Refills: 0 | DISCHARGE

## 2021-05-21 RX ORDER — LABETALOL HCL 100 MG
1 TABLET ORAL
Qty: 0 | Refills: 0 | DISCHARGE
Start: 2021-05-21

## 2021-05-21 RX ORDER — AMLODIPINE BESYLATE 2.5 MG/1
1 TABLET ORAL
Qty: 0 | Refills: 0 | DISCHARGE
Start: 2021-05-21

## 2021-05-21 RX ORDER — OXYCODONE HYDROCHLORIDE 5 MG/1
1 TABLET ORAL
Qty: 42 | Refills: 0
Start: 2021-05-21 | End: 2021-05-27

## 2021-05-21 RX ORDER — ISOSORBIDE MONONITRATE 60 MG/1
1 TABLET, EXTENDED RELEASE ORAL
Qty: 0 | Refills: 0 | DISCHARGE
Start: 2021-05-21

## 2021-05-21 RX ORDER — LIDOCAINE 4 G/100G
2 CREAM TOPICAL
Qty: 28 | Refills: 0
Start: 2021-05-21 | End: 2021-06-03

## 2021-05-21 RX ADMIN — CEFTRIAXONE 100 MILLIGRAM(S): 500 INJECTION, POWDER, FOR SOLUTION INTRAMUSCULAR; INTRAVENOUS at 05:15

## 2021-05-21 RX ADMIN — Medication 200 MILLIGRAM(S): at 05:15

## 2021-05-21 RX ADMIN — LIDOCAINE 2 PATCH: 4 CREAM TOPICAL at 00:19

## 2021-05-21 RX ADMIN — Medication 25 MICROGRAM(S): at 05:15

## 2021-05-21 RX ADMIN — SEVELAMER CARBONATE 800 MILLIGRAM(S): 2400 POWDER, FOR SUSPENSION ORAL at 08:11

## 2021-05-21 RX ADMIN — AMLODIPINE BESYLATE 10 MILLIGRAM(S): 2.5 TABLET ORAL at 05:15

## 2021-05-21 RX ADMIN — HEPARIN SODIUM 5000 UNIT(S): 5000 INJECTION INTRAVENOUS; SUBCUTANEOUS at 05:15

## 2021-05-21 RX ADMIN — Medication 1300 MILLIGRAM(S): at 05:15

## 2021-05-21 NOTE — DISCHARGE NOTE PROVIDER - NSDCCPCAREPLAN_GEN_ALL_CORE_FT
PRINCIPAL DISCHARGE DIAGNOSIS  Diagnosis: CKD (chronic kidney disease)  Assessment and Plan of Treatment: On admission you had a Creatinine 8.35, baseline around 5 and was evaluated in [facility] for kidney dysfunction 1 month ago. You refused the recommended treatment of hemodialysis. Your FeNa calculated showed intrinsic kidney disease. Nephrology Dr. Tim group followed up.      SECONDARY DISCHARGE DIAGNOSES  Diagnosis: Rheumatoid arthritis  Assessment and Plan of Treatment: You presented with knee pain, likely due to end-stage RA, Pt stop taking prednisone 1 year ago as she feels steroid makes her condition worse in long term and stopped taking methotrexate 5 years ago. Pain control with fentanyl patch, oxycodone    Diagnosis: CKD (chronic kidney disease)  Assessment and Plan of Treatment: Pt had elevated troponin 0.062 most likely due to CKD/RA, EKG showed no ischemic change, troponins trended down.    Diagnosis: UTI (urinary tract infection)  Assessment and Plan of Treatment: Pt had positive UA with dysuria , increased urinary frequency, treated with Rocephin 1g daily. UCx was positive for proteus mirabilis.     PRINCIPAL DISCHARGE DIAGNOSIS  Diagnosis: CKD (chronic kidney disease)  Assessment and Plan of Treatment: On admission you had a Creatinine 8.35, baseline around 5 and was evaluated in [facility] for kidney dysfunction 1 month ago. You refused the recommended treatment of hemodialysis. Your FeNa level calculated showed intrinsic kidney disease. Nephrology Dr. Tim group followed up.      SECONDARY DISCHARGE DIAGNOSES  Diagnosis: Rheumatoid arthritis  Assessment and Plan of Treatment: You presented with knee pain, likely due to worsening of the end-stage rheumatoid arthriti. You stopped taking prednisone and methotrexate almost a year ago. Your refused prednisone on this admission too. Pain was controlled with fentanyl patch, oxycodone.    Diagnosis: UTI (urinary tract infection)  Assessment and Plan of Treatment: You had positive urinalysis with dysuria , increased urinary frequency. You were treated with antibiotic of Rocephin 1g daily for 3 days. Your urine culture was positive for proteus mirabilis.    Diagnosis: NSTEMI (non-ST elevation myocardial infarction)  Assessment and Plan of Treatment: You had elevated troponin which trended down, EKG showed no ischemic change.    Diagnosis: Palliative care encounter  Assessment and Plan of Treatment: Palliative consulted because you were refusing the hemodialysis. You wished not to get hemodialysis. You and your family were unable to commit to further goals of care including hospice. You agreed for DNR/DNI. You and your sons wish to be discharged to follow up with outpatient physician.

## 2021-05-21 NOTE — DISCHARGE NOTE PROVIDER - HOSPITAL COURSE
82 y/o F pt from home lives with son, has HHA with a PMHx of HTN, Rheumatoid Arthritis and Anemia, kidney disease presents to ED c/o b/l knee pain and fall 5/18 evening. Pt is admitted for CKD/CARLITO and ambulatory dysfunction.    Lambert Callahan over phone [phone]     Rheumatologist : Dr. Rajeev Abarca [phone] ([phone]  nephrologist : Dr. Chevalier James [phone]     Problem/Plan - 1:  ·  Problem: CKD (chronic kidney disease).  Plan: - P/w Cr 8.35  - baseline unknown, but she was evaluated in [facility] for kidney dysfunction 1 mon ago  - Pt is refusing HD   - s/p danyell, pt is making urine  f/u urine study  f/u  FeNa   Nephro Dr. Tim group is consulted  Palliative is consulted as pt is refusing HD.      Problem/Plan - 2:  ·  Problem: Ambulatory dysfunction.  Plan: P/w fall due to b/l knee pain  likely due to end-stage RA  Pt stop taking prednisone 1 year ago as she feels steroid makes her condition worse in long term and stopped taking methotrexate 5 years ago  Pain control - pt is refusing prednisone  f/u PT eval.      Problem/Plan - 3:  ·  Problem: UTI (urinary tract infection).  Plan: - positive UA   - afebrile   - Pt has dysuria , increased urinary frequency  - Will start with rocephine 1g daily  - f/u urine cultures (specimen received).      Problem/Plan - 4:  ·  Problem: Hyperkalemia.  Plan: p/w K 5.4  Likely from CKD  s/p lokelma 10mg in ED  f/u BMP.      Problem/Plan - 5:  ·  Problem: Rheumatoid arthritis.  Plan: Pt stop taking prednisone 1 year ago as she feels steroid makes her condition worse in long term and stopped taking methotrexate 5 years ago  Not on any RA meds now  Pt is refusing prednisone  Pain control with tylenol, tramadol, percocet  Pain management is consulted.      Problem/Plan - 6:  Problem: Elevated troponin. Plan: p/w trop 0.062  EKG no ischemic change  Pt has no chest pain  Monitor tropnin  Next trop is in AM. 80 y/o F pt from home lives with son, has HHA with a PMHx of HTN, Rheumatoid Arthritis and Anemia, kidney disease presents to ED c/o b/l knee pain and fall 5/18 evening. Pt is admitted for CKD/CARLITO and ambulatory dysfunction.    Pt presented with Cr 8.35, baseline around 5 and was evaluated in [facility] for kidney dysfunction 1 mon ago. Pt is refusing HD. Was on child, pt is making urine. FeNa shows intrinsic kidney disease. Nephrology Dr. Tim group followed up.  P/w fall due to b/l knee pain, likely due to end-stage RA, Pt stop taking prednisone 1 year ago as she feels steroid makes her condition worse in long term and stopped taking methotrexate 5 years ago. Pain control with fentanyl patch, oxycodone  Pt had positive UA with dysuria , increased urinary frequency, treated with Rocephin 1g daily. UCx was positive for proteus mirabilis.  Pt p/w K 5.6, Likely from CKD, s/p lokelma 10mg in ED, resolved now.  Pt had elevated troponin 0.062 most likely due to CKD/RA, EKG showed no ischemic change, troponins trended down.     Palliative consulted as pt is refusing HD. Pt wished that she Pt stated she does not want HD. Was unable to commit to further goals of care including hospice. Discussed hospice philosophy and locations of care. Pt agreed for DNR/DNI.  Pt sons wants the pt to be discharged ASA so they may follow up with out pt MD.     80 y/o F pt from home lives with son, has HHA with a PMHx of HTN, Rheumatoid Arthritis and Anemia, kidney disease presents to ED c/o b/l knee pain and fall 5/18 evening. Pt is admitted for CKD/CARLITO and ambulatory dysfunction.    Pt presented with Cr 8.35, baseline around 5 and was evaluated in [facility] for kidney dysfunction 1 mon ago. Pt is refusing HD. Was on child, pt is making urine. FeNa shows intrinsic kidney disease. Nephrology Dr. Tim group followed up.  P/w fall due to b/l knee pain, likely due to end-stage RA, Pt stop taking prednisone 1 year ago as she feels steroid makes her condition worse in long term and stopped taking methotrexate 5 years ago. Pain control with fentanyl patch, oxycodone  Pt had positive UA with dysuria , increased urinary frequency, treated with Rocephin 1g daily. UCx was positive for proteus mirabilis.  Pt p/w K 5.6, Likely from CKD, s/p lokelma 10mg in ED, resolved now.  Pt had elevated troponin 0.062 most likely due to CKD/RA, EKG showed no ischemic change, troponins trended down.     Palliative consulted as pt is refusing HD. Pt wished that she Pt stated she does not want HD. Was unable to commit to further goals of care including hospice. Discussed hospice philosophy and locations of care. Pt agreed for DNR/DNI.  Pt son's wants the pt to be discharged so they may follow up with out pt MD.

## 2021-05-21 NOTE — DISCHARGE NOTE PROVIDER - ATTENDING COMMENTS
Patient and family refused all intervention and wants to go home and try alternative treatment- Patient is stable to dc home  time spent>30 minutes

## 2021-05-21 NOTE — DISCHARGE NOTE PROVIDER - NSDCMRMEDTOKEN_GEN_ALL_CORE_FT
amLODIPine 10 mg oral tablet: 1 tab(s) orally once a day  CALCITRIOL 0.25MCG CAPSULES: TAKE ONE CAPSULE BY MOUTH EVERY DAY  isosorbide mononitrate 120 mg oral tablet, extended release: 1 tab(s) orally once a day (in the morning)  labetalol 200 mg oral tablet: 1 tab(s) orally once a day  VITAMIN D2 85794NE (ERGO) CAP RX: TAKE 1 CAPSULE BY MOUTH WEEKLY   amLODIPine 10 mg oral tablet: 1 tab(s) orally once a day  CALCITRIOL 0.25MCG CAPSULES: TAKE ONE CAPSULE BY MOUTH EVERY DAY  isosorbide mononitrate 120 mg oral tablet, extended release: 1 tab(s) orally once a day  labetalol 200 mg oral tablet: 1 tab(s) orally once a day  VITAMIN D2 40521TD (ERGO) CAP RX: TAKE 1 CAPSULE BY MOUTH WEEKLY   amLODIPine 10 mg oral tablet: 1 tab(s) orally once a day  CALCITRIOL 0.25MCG CAPSULES: TAKE ONE CAPSULE BY MOUTH EVERY DAY  isosorbide mononitrate 120 mg oral tablet, extended release: 1 tab(s) orally once a day  labetalol 200 mg oral tablet: 1 tab(s) orally 2 times a day  Lidoderm 5% topical film: Apply topically to both knees once a day   Oxaydo 5 mg oral tablet: 1 tab(s) orally every 4 hours, As needed, Severe Pain (7 - 10) MDD:20mg  VITAMIN D2 26831YI (ERGO) CAP RX: TAKE 1 CAPSULE BY MOUTH WEEKLY

## 2021-05-21 NOTE — DISCHARGE NOTE NURSING/CASE MANAGEMENT/SOCIAL WORK - PATIENT PORTAL LINK FT
You can access the FollowMyHealth Patient Portal offered by [facility] by registering at the following website: [email or web address]. By joining [facility]’s FollowMyHealth portal, you will also be able to view your health information using other applications (apps) compatible with our system.

## 2021-05-26 LAB
ANA PAT FLD IF-IMP: ABNORMAL
ANA TITR SER: ABNORMAL

## 2021-06-15 ENCOUNTER — TRANSCRIPTION ENCOUNTER (OUTPATIENT)
Age: 81
End: 2021-06-15

## 2023-05-30 NOTE — PROGRESS NOTE ADULT - CONVERSATION/DISCUSSION
This is a 85 yo with h/o permanant AFIB , multiple attempts at cardioversion unsucessful on Xeralto , h/o hyperlipedemia , HTN , presents after getting up from bed and her leg gave out which lead to a fall and resultant hip Fx . Of not she took Ambien for the first time. She is usually very active and actually participates in [employer] classes at the pool. She has no Sxs of CP or SOB . She has never had syncope before.   5/15- Echo showed EF 55-60 with mild MR and aortic sclerosisi but no stenosis  Cardiac PET 11/21 - negative for ischemia   given recent negative cardiac PET and echo and lack of SXs with good functional capacity she is optimized to proceed with ORIF from a cardiac standpoint,  elevated HR and BP from not taking meds and pain most likely   1) cont Xarelto    2) cont diltiazem er 180 daily ,  simivastatin 20 qhs change Coreg to Metoprolol, can use PRN IV lopressor if HR >120       This is a 87 yo with h/o permanant AFIB , multiple attempts at cardioversion unsucessful on Xeralto , h/o hyperlipedemia , HTN , presents after getting up from bed and her leg gave out which lead to a fall and resultant hip Fx . Of not she took Ambien for the first time. She is usually very active and actually participates in [employer] classes at the pool. She has no Sxs of CP or SOB . She has never had syncope before.   5/15- Echo showed EF 55-60 with mild MR and aortic sclerosis but no stenosis  Cardiac PET 11/21 - negative for ischemia   given recent negative cardiac PET and echo and lack of SXs with good functional capacity she is optimized to proceed with ORIF from a cardiac standpoint,  elevated HR and BP from not taking meds and pain most likely   1) cont Xarelto    2) cont diltiazem er 180 daily ,  simvastatin 20 qhs  Coreg changed to Metoprolol (dose reduced to 37.5mg BID) would c/w tele monitoring       Case d/w Dr. atkins     Diagnosis/Prognosis

## 2023-07-13 NOTE — PROGRESS NOTE ADULT - PROBLEM SELECTOR PROBLEM 4
----- Message from Aj Stallings LPN sent at 7/13/2023 12:33 PM CDT -----  Regarding: CONSULT APPT  Hi , my name is Jostin (Pre-op anesthesia). Dr. Ortiz was needing an appointment (consult) setup for Ms. Cordova with any of your providers. She's due to have surgery on 8/2/23 pending her pulmonary eval. Any help will be awesome, if anything is needed on our end just let me know. Jostin [phone] (ext [phone]) . Thanks in advance.    
Hyperkalemia
Debility